# Patient Record
Sex: MALE | Race: WHITE | Employment: OTHER | URBAN - METROPOLITAN AREA
[De-identification: names, ages, dates, MRNs, and addresses within clinical notes are randomized per-mention and may not be internally consistent; named-entity substitution may affect disease eponyms.]

---

## 2018-03-31 ENCOUNTER — APPOINTMENT (OUTPATIENT)
Dept: GENERAL RADIOLOGY | Age: 38
End: 2018-03-31
Payer: MEDICARE

## 2018-03-31 ENCOUNTER — HOSPITAL ENCOUNTER (EMERGENCY)
Age: 38
Discharge: HOME | End: 2018-03-31
Payer: MEDICARE

## 2018-03-31 VITALS
OXYGEN SATURATION: 99 % | TEMPERATURE: 97.2 F | DIASTOLIC BLOOD PRESSURE: 82 MMHG | SYSTOLIC BLOOD PRESSURE: 131 MMHG | WEIGHT: 220 LBS | HEART RATE: 79 BPM | BODY MASS INDEX: 35.36 KG/M2 | HEIGHT: 66 IN | RESPIRATION RATE: 16 BRPM

## 2018-03-31 DIAGNOSIS — M54.50 ACUTE LOW BACK PAIN WITHOUT SCIATICA, UNSPECIFIED BACK PAIN LATERALITY: Primary | ICD-10-CM

## 2018-03-31 PROCEDURE — 99284 EMERGENCY DEPT VISIT MOD MDM: CPT

## 2018-03-31 PROCEDURE — 6370000000 HC RX 637 (ALT 250 FOR IP): Performed by: PHYSICIAN ASSISTANT

## 2018-03-31 PROCEDURE — 72100 X-RAY EXAM L-S SPINE 2/3 VWS: CPT

## 2018-03-31 RX ORDER — IBUPROFEN 800 MG/1
800 TABLET ORAL ONCE
Status: COMPLETED | OUTPATIENT
Start: 2018-03-31 | End: 2018-03-31

## 2018-03-31 RX ORDER — NAPROXEN 500 MG/1
500 TABLET ORAL 2 TIMES DAILY
Qty: 14 TABLET | Refills: 0 | Status: SHIPPED | OUTPATIENT
Start: 2018-03-31 | End: 2018-06-09 | Stop reason: ALTCHOICE

## 2018-03-31 RX ADMIN — IBUPROFEN 800 MG: 800 TABLET, FILM COATED ORAL at 11:34

## 2018-03-31 ASSESSMENT — PAIN DESCRIPTION - LOCATION: LOCATION: BACK

## 2018-03-31 ASSESSMENT — PAIN SCALES - GENERAL
PAINLEVEL_OUTOF10: 9
PAINLEVEL_OUTOF10: 8
PAINLEVEL_OUTOF10: 9

## 2018-03-31 ASSESSMENT — PAIN DESCRIPTION - DESCRIPTORS: DESCRIPTORS: PENETRATING

## 2018-03-31 ASSESSMENT — PAIN DESCRIPTION - ORIENTATION: ORIENTATION: LOWER

## 2018-03-31 ASSESSMENT — PAIN DESCRIPTION - FREQUENCY: FREQUENCY: INTERMITTENT

## 2018-03-31 ASSESSMENT — PAIN DESCRIPTION - PAIN TYPE: TYPE: ACUTE PAIN

## 2018-06-09 ENCOUNTER — HOSPITAL ENCOUNTER (EMERGENCY)
Age: 38
Discharge: HOME | End: 2018-06-09
Payer: MEDICARE

## 2018-06-09 VITALS
RESPIRATION RATE: 18 BRPM | OXYGEN SATURATION: 97 % | BODY MASS INDEX: 32.3 KG/M2 | HEART RATE: 80 BPM | HEIGHT: 66 IN | DIASTOLIC BLOOD PRESSURE: 120 MMHG | WEIGHT: 201 LBS | TEMPERATURE: 97.8 F | SYSTOLIC BLOOD PRESSURE: 204 MMHG

## 2018-06-09 DIAGNOSIS — K02.9 DENTAL DECAY: ICD-10-CM

## 2018-06-09 DIAGNOSIS — K04.7 DENTAL INFECTION: ICD-10-CM

## 2018-06-09 DIAGNOSIS — K04.7 DENTAL ABSCESS: Primary | ICD-10-CM

## 2018-06-09 PROCEDURE — 6360000002 HC RX W HCPCS: Performed by: NURSE PRACTITIONER

## 2018-06-09 PROCEDURE — 6370000000 HC RX 637 (ALT 250 FOR IP): Performed by: NURSE PRACTITIONER

## 2018-06-09 PROCEDURE — 99282 EMERGENCY DEPT VISIT SF MDM: CPT

## 2018-06-09 PROCEDURE — 96374 THER/PROPH/DIAG INJ IV PUSH: CPT

## 2018-06-09 RX ORDER — CLINDAMYCIN HYDROCHLORIDE 150 MG/1
300 CAPSULE ORAL ONCE
Status: COMPLETED | OUTPATIENT
Start: 2018-06-09 | End: 2018-06-09

## 2018-06-09 RX ORDER — IBUPROFEN 800 MG/1
800 TABLET ORAL EVERY 6 HOURS PRN
Qty: 20 TABLET | Refills: 3 | Status: SHIPPED | OUTPATIENT
Start: 2018-06-09 | End: 2018-06-14

## 2018-06-09 RX ORDER — OXYCODONE HYDROCHLORIDE AND ACETAMINOPHEN 5; 325 MG/1; MG/1
2 TABLET ORAL ONCE
Status: COMPLETED | OUTPATIENT
Start: 2018-06-09 | End: 2018-06-09

## 2018-06-09 RX ORDER — DEXAMETHASONE SODIUM PHOSPHATE 10 MG/ML
10 INJECTION, SOLUTION INTRAMUSCULAR; INTRAVENOUS ONCE
Status: COMPLETED | OUTPATIENT
Start: 2018-06-09 | End: 2018-06-09

## 2018-06-09 RX ORDER — OXYCODONE HYDROCHLORIDE AND ACETAMINOPHEN 5; 325 MG/1; MG/1
1 TABLET ORAL EVERY 6 HOURS PRN
Qty: 10 TABLET | Refills: 0 | Status: SHIPPED | OUTPATIENT
Start: 2018-06-09 | End: 2018-06-12

## 2018-06-09 RX ORDER — CLINDAMYCIN HYDROCHLORIDE 300 MG/1
300 CAPSULE ORAL 3 TIMES DAILY
Qty: 30 CAPSULE | Refills: 0 | Status: SHIPPED | OUTPATIENT
Start: 2018-06-09 | End: 2018-06-19

## 2018-06-09 RX ADMIN — OXYCODONE HYDROCHLORIDE AND ACETAMINOPHEN 2 TABLET: 5; 325 TABLET ORAL at 08:56

## 2018-06-09 RX ADMIN — DEXAMETHASONE SODIUM PHOSPHATE 10 MG: 10 INJECTION, SOLUTION INTRAMUSCULAR; INTRAVENOUS at 08:56

## 2018-06-09 RX ADMIN — CLINDAMYCIN HYDROCHLORIDE 300 MG: 150 CAPSULE ORAL at 08:55

## 2018-06-09 ASSESSMENT — PAIN DESCRIPTION - LOCATION: LOCATION: TEETH

## 2018-06-09 ASSESSMENT — PAIN DESCRIPTION - ORIENTATION: ORIENTATION: LEFT;UPPER

## 2018-06-09 ASSESSMENT — PAIN SCALES - GENERAL
PAINLEVEL_OUTOF10: 10
PAINLEVEL_OUTOF10: 9

## 2018-06-09 ASSESSMENT — PAIN DESCRIPTION - PAIN TYPE: TYPE: ACUTE PAIN

## 2018-06-09 ASSESSMENT — PAIN DESCRIPTION - DESCRIPTORS: DESCRIPTORS: THROBBING

## 2021-06-19 ENCOUNTER — APPOINTMENT (OUTPATIENT)
Dept: GENERAL RADIOLOGY | Age: 41
End: 2021-06-19
Payer: MEDICARE

## 2021-06-19 ENCOUNTER — APPOINTMENT (OUTPATIENT)
Dept: CT IMAGING | Age: 41
End: 2021-06-19
Payer: MEDICARE

## 2021-06-19 ENCOUNTER — HOSPITAL ENCOUNTER (EMERGENCY)
Age: 41
Discharge: HOME | End: 2021-06-19
Attending: EMERGENCY MEDICINE
Payer: MEDICARE

## 2021-06-19 VITALS
HEIGHT: 66 IN | SYSTOLIC BLOOD PRESSURE: 175 MMHG | WEIGHT: 211 LBS | TEMPERATURE: 97.8 F | HEART RATE: 81 BPM | OXYGEN SATURATION: 95 % | DIASTOLIC BLOOD PRESSURE: 118 MMHG | RESPIRATION RATE: 18 BRPM | BODY MASS INDEX: 33.91 KG/M2

## 2021-06-19 DIAGNOSIS — E87.6 HYPOKALEMIA: ICD-10-CM

## 2021-06-19 DIAGNOSIS — S27.329A CONTUSION OF LUNG, UNSPECIFIED LATERALITY, INITIAL ENCOUNTER: ICD-10-CM
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
## 2021-06-19 DIAGNOSIS — S42.001A CLOSED NONDISPLACED FRACTURE OF RIGHT CLAVICLE, UNSPECIFIED PART OF CLAVICLE, INITIAL ENCOUNTER: Primary | ICD-10-CM

## 2021-06-19 LAB
ANION GAP SERPL CALCULATED.3IONS-SCNC: 14 MMOL/L (ref 7–16)
BASOPHILS ABSOLUTE: 0.05 E9/L (ref 0–0.2)
BASOPHILS RELATIVE PERCENT: 0.2 % (ref 0–2)
BUN BLDV-MCNC: 3 MG/DL (ref 6–20)
CALCIUM SERPL-MCNC: 9.9 MG/DL (ref 8.6–10.2)
CHLORIDE BLD-SCNC: 99 MMOL/L (ref 98–107)
CO2: 27 MMOL/L (ref 22–29)
CREAT SERPL-MCNC: 0.8 MG/DL (ref 0.7–1.2)
EKG ATRIAL RATE: 99 BPM
EKG P AXIS: 33 DEGREES
EKG P-R INTERVAL: 150 MS
EKG Q-T INTERVAL: 372 MS
EKG QRS DURATION: 98 MS
EKG QTC CALCULATION (BAZETT): 477 MS
EKG R AXIS: 43 DEGREES
EKG T AXIS: 14 DEGREES
EKG VENTRICULAR RATE: 99 BPM
EOSINOPHILS ABSOLUTE: 0 E9/L (ref 0.05–0.5)
EOSINOPHILS RELATIVE PERCENT: 0 % (ref 0–6)
GFR AFRICAN AMERICAN: >60
GFR NON-AFRICAN AMERICAN: >60 ML/MIN/1.73
GLUCOSE BLD-MCNC: 188 MG/DL (ref 74–99)
HCT VFR BLD CALC: 52 % (ref 37–54)
HEMOGLOBIN: 17.4 G/DL (ref 12.5–16.5)
IMMATURE GRANULOCYTES #: 0.15 E9/L
IMMATURE GRANULOCYTES %: 0.7 % (ref 0–5)
LYMPHOCYTES ABSOLUTE: 2.22 E9/L (ref 1.5–4)
LYMPHOCYTES RELATIVE PERCENT: 10.3 % (ref 20–42)
MAGNESIUM: 1.9 MG/DL (ref 1.6–2.6)
MCH RBC QN AUTO: 28.3 PG (ref 26–35)
MCHC RBC AUTO-ENTMCNC: 33.5 % (ref 32–34.5)
MCV RBC AUTO: 84.7 FL (ref 80–99.9)
MONOCYTES ABSOLUTE: 1.18 E9/L (ref 0.1–0.95)
MONOCYTES RELATIVE PERCENT: 5.5 % (ref 2–12)
NEUTROPHILS ABSOLUTE: 17.99 E9/L (ref 1.8–7.3)
NEUTROPHILS RELATIVE PERCENT: 83.3 % (ref 43–80)
PDW BLD-RTO: 14.7 FL (ref 11.5–15)
PLATELET # BLD: 297 E9/L (ref 130–450)
PMV BLD AUTO: 10.7 FL (ref 7–12)
POTASSIUM REFLEX MAGNESIUM: 3.1 MMOL/L (ref 3.5–5)
RBC # BLD: 6.14 E12/L (ref 3.8–5.8)
SODIUM BLD-SCNC: 140 MMOL/L (ref 132–146)
WBC # BLD: 21.6 E9/L (ref 4.5–11.5)

## 2021-06-19 PROCEDURE — 96374 THER/PROPH/DIAG INJ IV PUSH: CPT

## 2021-06-19 PROCEDURE — 74176 CT ABD & PELVIS W/O CONTRAST: CPT

## 2021-06-19 PROCEDURE — 85025 COMPLETE CBC W/AUTO DIFF WBC: CPT

## 2021-06-19 PROCEDURE — 93010 ELECTROCARDIOGRAM REPORT: CPT | Performed by: INTERNAL MEDICINE

## 2021-06-19 PROCEDURE — 70450 CT HEAD/BRAIN W/O DYE: CPT

## 2021-06-19 PROCEDURE — 72125 CT NECK SPINE W/O DYE: CPT

## 2021-06-19 PROCEDURE — 71045 X-RAY EXAM CHEST 1 VIEW: CPT

## 2021-06-19 PROCEDURE — 6360000002 HC RX W HCPCS: Performed by: STUDENT IN AN ORGANIZED HEALTH CARE EDUCATION/TRAINING PROGRAM

## 2021-06-19 PROCEDURE — 6370000000 HC RX 637 (ALT 250 FOR IP): Performed by: STUDENT IN AN ORGANIZED HEALTH CARE EDUCATION/TRAINING PROGRAM

## 2021-06-19 PROCEDURE — 99285 EMERGENCY DEPT VISIT HI MDM: CPT

## 2021-06-19 PROCEDURE — 83735 ASSAY OF MAGNESIUM: CPT

## 2021-06-19 PROCEDURE — 93005 ELECTROCARDIOGRAM TRACING: CPT | Performed by: STUDENT IN AN ORGANIZED HEALTH CARE EDUCATION/TRAINING PROGRAM

## 2021-06-19 PROCEDURE — 96375 TX/PRO/DX INJ NEW DRUG ADDON: CPT

## 2021-06-19 PROCEDURE — 80048 BASIC METABOLIC PNL TOTAL CA: CPT

## 2021-06-19 PROCEDURE — 71250 CT THORAX DX C-: CPT

## 2021-06-19 PROCEDURE — 73030 X-RAY EXAM OF SHOULDER: CPT

## 2021-06-19 PROCEDURE — 73060 X-RAY EXAM OF HUMERUS: CPT

## 2021-06-19 RX ORDER — FENTANYL CITRATE 50 UG/ML
100 INJECTION, SOLUTION INTRAMUSCULAR; INTRAVENOUS ONCE
Status: COMPLETED | OUTPATIENT
Start: 2021-06-19 | End: 2021-06-19

## 2021-06-19 RX ORDER — HYDROCODONE BITARTRATE AND ACETAMINOPHEN 5; 325 MG/1; MG/1
1 TABLET ORAL EVERY 8 HOURS PRN
Qty: 8 TABLET | Refills: 0 | Status: SHIPPED | OUTPATIENT
Start: 2021-06-19 | End: 2021-06-24

## 2021-06-19 RX ORDER — KETOROLAC TROMETHAMINE 30 MG/ML
15 INJECTION, SOLUTION INTRAMUSCULAR; INTRAVENOUS ONCE
Status: COMPLETED | OUTPATIENT
Start: 2021-06-19 | End: 2021-06-19

## 2021-06-19 RX ADMIN — FENTANYL CITRATE 100 MCG: 50 INJECTION, SOLUTION INTRAMUSCULAR; INTRAVENOUS at 17:37

## 2021-06-19 RX ADMIN — KETOROLAC TROMETHAMINE 15 MG: 30 INJECTION, SOLUTION INTRAMUSCULAR; INTRAVENOUS at 21:00

## 2021-06-19 RX ADMIN — POTASSIUM BICARBONATE 40 MEQ: 782 TABLET, EFFERVESCENT ORAL at 18:58

## 2021-06-19 ASSESSMENT — PAIN DESCRIPTION - ONSET: ONSET: ON-GOING

## 2021-06-19 ASSESSMENT — PAIN - FUNCTIONAL ASSESSMENT
PAIN_FUNCTIONAL_ASSESSMENT: PREVENTS OR INTERFERES WITH ALL ACTIVE AND SOME PASSIVE ACTIVITIES
PAIN_FUNCTIONAL_ASSESSMENT: 0-10

## 2021-06-19 ASSESSMENT — PAIN DESCRIPTION - ORIENTATION: ORIENTATION: RIGHT

## 2021-06-19 ASSESSMENT — ENCOUNTER SYMPTOMS
COUGH: 0
WHEEZING: 0
EYE DISCHARGE: 0
SINUS PRESSURE: 0
EYE PAIN: 0
BACK PAIN: 0
SHORTNESS OF BREATH: 1
NAUSEA: 0
SORE THROAT: 0
DIARRHEA: 0
EYE REDNESS: 0
ABDOMINAL PAIN: 0
VOMITING: 0

## 2021-06-19 ASSESSMENT — PAIN SCALES - GENERAL
PAINLEVEL_OUTOF10: 9
PAINLEVEL_OUTOF10: 9
PAINLEVEL_OUTOF10: 8
PAINLEVEL_OUTOF10: 9

## 2021-06-19 ASSESSMENT — PAIN DESCRIPTION - DESCRIPTORS: DESCRIPTORS: SHARP

## 2021-06-19 ASSESSMENT — PAIN DESCRIPTION - PAIN TYPE: TYPE: ACUTE PAIN

## 2021-06-19 ASSESSMENT — PAIN DESCRIPTION - FREQUENCY: FREQUENCY: INTERMITTENT

## 2021-06-19 ASSESSMENT — PAIN DESCRIPTION - LOCATION: LOCATION: SHOULDER

## 2021-06-19 ASSESSMENT — PAIN DESCRIPTION - PROGRESSION: CLINICAL_PROGRESSION: GRADUALLY IMPROVING

## 2021-06-19 NOTE — ED NOTES
1721 Hrs - Trauma Alert called  5338 Hrs - Dr Antonio Glass called in for status     Dante Bucky  06/19/21 1729

## 2021-06-19 NOTE — PROGRESS NOTES
Incentive Spirometry performed. Patient achieved 1800mL with best effort with the goal being 1500mL.
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
## 2021-06-19 NOTE — ED NOTES
Dr. Duane Plush removed C-collar from patient due to negative CT scan     Roberto Garcia RN  06/19/21 1928
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
## 2023-06-30 ENCOUNTER — HOSPITAL ENCOUNTER (EMERGENCY)
Age: 43
Discharge: HOME | End: 2023-06-30
Attending: STUDENT IN AN ORGANIZED HEALTH CARE EDUCATION/TRAINING PROGRAM
Payer: MEDICARE

## 2023-06-30 ENCOUNTER — APPOINTMENT (OUTPATIENT)
Dept: GENERAL RADIOLOGY | Age: 43
End: 2023-06-30
Payer: MEDICARE

## 2023-06-30 VITALS
BODY MASS INDEX: 29.86 KG/M2 | SYSTOLIC BLOOD PRESSURE: 174 MMHG | HEART RATE: 91 BPM | TEMPERATURE: 98 F | RESPIRATION RATE: 18 BRPM | OXYGEN SATURATION: 98 % | WEIGHT: 185 LBS | DIASTOLIC BLOOD PRESSURE: 122 MMHG

## 2023-06-30 DIAGNOSIS — I10: ICD-10-CM
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
## 2023-06-30 DIAGNOSIS — S51.851A CAT BITE OF FOREARM, RIGHT, INITIAL ENCOUNTER: Primary | ICD-10-CM

## 2023-06-30 PROCEDURE — 96372 THER/PROPH/DIAG INJ SC/IM: CPT

## 2023-06-30 PROCEDURE — 90714 TD VACC NO PRESV 7 YRS+ IM: CPT | Performed by: NURSE PRACTITIONER

## 2023-06-30 PROCEDURE — 90471 IMMUNIZATION ADMIN: CPT | Performed by: NURSE PRACTITIONER

## 2023-06-30 PROCEDURE — 6370000000 HC RX 637 (ALT 250 FOR IP): Performed by: STUDENT IN AN ORGANIZED HEALTH CARE EDUCATION/TRAINING PROGRAM

## 2023-06-30 PROCEDURE — 99284 EMERGENCY DEPT VISIT MOD MDM: CPT

## 2023-06-30 PROCEDURE — 6370000000 HC RX 637 (ALT 250 FOR IP): Performed by: NURSE PRACTITIONER

## 2023-06-30 PROCEDURE — 73110 X-RAY EXAM OF WRIST: CPT

## 2023-06-30 PROCEDURE — 6360000002 HC RX W HCPCS: Performed by: NURSE PRACTITIONER

## 2023-06-30 RX ORDER — CLINDAMYCIN HYDROCHLORIDE 150 MG/1
300 CAPSULE ORAL ONCE
Status: COMPLETED | OUTPATIENT
Start: 2023-06-30 | End: 2023-06-30

## 2023-06-30 RX ORDER — AMLODIPINE BESYLATE 5 MG/1
5 TABLET ORAL DAILY
Qty: 14 TABLET | Refills: 0 | Status: SHIPPED | OUTPATIENT
Start: 2023-06-30 | End: 2023-07-14

## 2023-06-30 RX ORDER — CLINDAMYCIN HYDROCHLORIDE 300 MG/1
300 CAPSULE ORAL 3 TIMES DAILY
Qty: 30 CAPSULE | Refills: 0 | Status: SHIPPED | OUTPATIENT
Start: 2023-06-30 | End: 2023-07-10

## 2023-06-30 RX ORDER — CYCLOBENZAPRINE HCL 5 MG
10 TABLET ORAL ONCE
Status: COMPLETED | OUTPATIENT
Start: 2023-06-30 | End: 2023-06-30

## 2023-06-30 RX ORDER — DOXYCYCLINE HYCLATE 100 MG
100 TABLET ORAL 2 TIMES DAILY
Qty: 20 TABLET | Refills: 0 | Status: SHIPPED | OUTPATIENT
Start: 2023-06-30 | End: 2023-07-10

## 2023-06-30 RX ORDER — TETANUS AND DIPHTHERIA TOXOIDS ADSORBED 2; 2 [LF]/.5ML; [LF]/.5ML
0.5 INJECTION INTRAMUSCULAR ONCE
Status: COMPLETED | OUTPATIENT
Start: 2023-06-30 | End: 2023-06-30

## 2023-06-30 RX ORDER — CLONIDINE HYDROCHLORIDE 0.1 MG/1
0.1 TABLET ORAL ONCE
Status: COMPLETED | OUTPATIENT
Start: 2023-06-30 | End: 2023-06-30

## 2023-06-30 RX ORDER — KETOROLAC TROMETHAMINE 30 MG/ML
30 INJECTION, SOLUTION INTRAMUSCULAR; INTRAVENOUS ONCE
Status: COMPLETED | OUTPATIENT
Start: 2023-06-30 | End: 2023-06-30

## 2023-06-30 RX ORDER — AMLODIPINE BESYLATE 5 MG/1
5 TABLET ORAL ONCE
Status: COMPLETED | OUTPATIENT
Start: 2023-06-30 | End: 2023-06-30

## 2023-06-30 RX ORDER — DOXYCYCLINE HYCLATE 100 MG/1
100 CAPSULE ORAL ONCE
Status: COMPLETED | OUTPATIENT
Start: 2023-06-30 | End: 2023-06-30

## 2023-06-30 RX ORDER — CLONIDINE HYDROCHLORIDE 0.1 MG/1
0.1 TABLET ORAL DAILY
Qty: 14 TABLET | Refills: 0 | Status: SHIPPED | OUTPATIENT
Start: 2023-06-30 | End: 2023-07-14

## 2023-06-30 RX ADMIN — CLINDAMYCIN HYDROCHLORIDE 300 MG: 150 CAPSULE ORAL at 17:35

## 2023-06-30 RX ADMIN — CLONIDINE HYDROCHLORIDE 0.1 MG: 0.1 TABLET ORAL at 21:51

## 2023-06-30 RX ADMIN — KETOROLAC TROMETHAMINE 30 MG: 30 INJECTION, SOLUTION INTRAMUSCULAR; INTRAVENOUS at 19:24

## 2023-06-30 RX ADMIN — TETANUS AND DIPHTHERIA TOXOIDS ADSORBED 0.5 ML: 2; 2 INJECTION INTRAMUSCULAR at 17:38

## 2023-06-30 RX ADMIN — CYCLOBENZAPRINE HYDROCHLORIDE 10 MG: 5 TABLET, FILM COATED ORAL at 21:51

## 2023-06-30 RX ADMIN — DOXYCYCLINE HYCLATE 100 MG: 100 CAPSULE ORAL at 17:34

## 2023-06-30 RX ADMIN — AMLODIPINE BESYLATE 5 MG: 5 TABLET ORAL at 19:23

## 2023-06-30 ASSESSMENT — PAIN DESCRIPTION - ORIENTATION: ORIENTATION: RIGHT

## 2023-06-30 ASSESSMENT — PAIN DESCRIPTION - LOCATION: LOCATION: WRIST

## 2023-06-30 ASSESSMENT — LIFESTYLE VARIABLES: HOW OFTEN DO YOU HAVE A DRINK CONTAINING ALCOHOL: MONTHLY OR LESS

## 2023-06-30 ASSESSMENT — PAIN - FUNCTIONAL ASSESSMENT: PAIN_FUNCTIONAL_ASSESSMENT: NONE - DENIES PAIN

## 2023-06-30 ASSESSMENT — PAIN SCALES - GENERAL: PAINLEVEL_OUTOF10: 8

## 2024-07-01 ENCOUNTER — HOSPITAL ENCOUNTER (INPATIENT)
Age: 44
LOS: 1 days | Discharge: LEFT BEFORE DISCHARGE | DRG: 202 | End: 2024-07-01
Attending: EMERGENCY MEDICINE | Admitting: INTERNAL MEDICINE
Payer: MEDICARE

## 2024-07-01 ENCOUNTER — APPOINTMENT (OUTPATIENT)
Dept: GENERAL RADIOLOGY | Age: 44
DRG: 202 | End: 2024-07-01
Payer: MEDICARE

## 2024-07-01 VITALS
SYSTOLIC BLOOD PRESSURE: 148 MMHG | BODY MASS INDEX: 30.53 KG/M2 | HEART RATE: 96 BPM | HEIGHT: 66 IN | TEMPERATURE: 98.2 F | RESPIRATION RATE: 18 BRPM | WEIGHT: 190 LBS | DIASTOLIC BLOOD PRESSURE: 100 MMHG | OXYGEN SATURATION: 95 %

## 2024-07-01 DIAGNOSIS — I50.9 CONGESTIVE HEART FAILURE, UNSPECIFIED HF CHRONICITY, UNSPECIFIED HEART FAILURE TYPE (HCC): ICD-10-CM

## 2024-07-01 DIAGNOSIS — I10 ESSENTIAL HYPERTENSION: Primary | ICD-10-CM

## 2024-07-01 DIAGNOSIS — J40 BRONCHITIS: ICD-10-CM

## 2024-07-01 DIAGNOSIS — I10 HYPERTENSION, UNSPECIFIED TYPE: ICD-10-CM

## 2024-07-01 DIAGNOSIS — I21.4 NSTEMI (NON-ST ELEVATED MYOCARDIAL INFARCTION) (HCC): ICD-10-CM

## 2024-07-01 DIAGNOSIS — I16.1 HYPERTENSIVE EMERGENCY: ICD-10-CM

## 2024-07-01 LAB
ALBUMIN SERPL-MCNC: 3.3 G/DL (ref 3.5–5.2)
ALP SERPL-CCNC: 152 U/L (ref 40–129)
ALT SERPL-CCNC: 34 U/L (ref 0–40)
AMYLASE SERPL-CCNC: 39 U/L (ref 20–100)
ANION GAP SERPL CALCULATED.3IONS-SCNC: 14 MMOL/L (ref 7–16)
AST SERPL-CCNC: 32 U/L (ref 0–39)
BILIRUB DIRECT SERPL-MCNC: 0.3 MG/DL (ref 0–0.3)
BILIRUB INDIRECT SERPL-MCNC: 0.4 MG/DL (ref 0–1)
BILIRUB SERPL-MCNC: 0.7 MG/DL (ref 0–1.2)
BNP SERPL-MCNC: 7835 PG/ML (ref 0–125)
BUN SERPL-MCNC: 14 MG/DL (ref 6–20)
CALCIUM SERPL-MCNC: 8.9 MG/DL (ref 8.6–10.2)
CHLORIDE SERPL-SCNC: 100 MMOL/L (ref 98–107)
CK SERPL-CCNC: 80 U/L (ref 20–200)
CO2 SERPL-SCNC: 24 MMOL/L (ref 22–29)
CREAT SERPL-MCNC: 1.4 MG/DL (ref 0.7–1.2)
D-DIMER QUANTITATIVE: 335 NG/ML DDU (ref 0–230)
EKG ATRIAL RATE: 117 BPM
EKG P AXIS: 40 DEGREES
EKG P-R INTERVAL: 146 MS
EKG Q-T INTERVAL: 348 MS
EKG QRS DURATION: 96 MS
EKG QTC CALCULATION (BAZETT): 485 MS
EKG R AXIS: 19 DEGREES
EKG T AXIS: 0 DEGREES
EKG VENTRICULAR RATE: 117 BPM
ERYTHROCYTE [DISTWIDTH] IN BLOOD BY AUTOMATED COUNT: 15.5 % (ref 11.5–15)
FLUAV RNA RESP QL NAA+PROBE: NOT DETECTED
FLUBV RNA RESP QL NAA+PROBE: NOT DETECTED
GFR, ESTIMATED: 63 ML/MIN/1.73M2
GLUCOSE SERPL-MCNC: 180 MG/DL (ref 74–99)
HCT VFR BLD AUTO: 49.9 % (ref 37–54)
HGB BLD-MCNC: 15.8 G/DL (ref 12.5–16.5)
INR PPP: 1.6
LIPASE SERPL-CCNC: 30 U/L (ref 13–60)
MAGNESIUM SERPL-MCNC: 1.8 MG/DL (ref 1.6–2.6)
MCH RBC QN AUTO: 25.9 PG (ref 26–35)
MCHC RBC AUTO-ENTMCNC: 31.7 G/DL (ref 32–34.5)
MCV RBC AUTO: 81.9 FL (ref 80–99.9)
PLATELET # BLD AUTO: 262 K/UL (ref 130–450)
PMV BLD AUTO: 10.6 FL (ref 7–12)
POTASSIUM SERPL-SCNC: 3.6 MMOL/L (ref 3.5–5)
PROT SERPL-MCNC: 5.9 G/DL (ref 6.4–8.3)
PROTHROMBIN TIME: 17 SEC (ref 9.3–12.4)
RBC # BLD AUTO: 6.09 M/UL (ref 3.8–5.8)
RSV BY PCR: NOT DETECTED
SARS-COV-2 RNA RESP QL NAA+PROBE: NOT DETECTED
SODIUM SERPL-SCNC: 138 MMOL/L (ref 132–146)
SOURCE: NORMAL
SPECIMEN DESCRIPTION: NORMAL
SPECIMEN SOURCE: NORMAL
TROPONIN I SERPL HS-MCNC: 94 NG/L (ref 0–11)
WBC OTHER # BLD: 9.4 K/UL (ref 4.5–11.5)

## 2024-07-01 PROCEDURE — 84484 ASSAY OF TROPONIN QUANT: CPT

## 2024-07-01 PROCEDURE — 85610 PROTHROMBIN TIME: CPT

## 2024-07-01 PROCEDURE — 82248 BILIRUBIN DIRECT: CPT

## 2024-07-01 PROCEDURE — 6360000002 HC RX W HCPCS: Performed by: EMERGENCY MEDICINE

## 2024-07-01 PROCEDURE — 6370000000 HC RX 637 (ALT 250 FOR IP): Performed by: EMERGENCY MEDICINE

## 2024-07-01 PROCEDURE — 96365 THER/PROPH/DIAG IV INF INIT: CPT

## 2024-07-01 PROCEDURE — 93005 ELECTROCARDIOGRAM TRACING: CPT | Performed by: EMERGENCY MEDICINE

## 2024-07-01 PROCEDURE — 71045 X-RAY EXAM CHEST 1 VIEW: CPT

## 2024-07-01 PROCEDURE — 96375 TX/PRO/DX INJ NEW DRUG ADDON: CPT

## 2024-07-01 PROCEDURE — 99285 EMERGENCY DEPT VISIT HI MDM: CPT

## 2024-07-01 PROCEDURE — 83690 ASSAY OF LIPASE: CPT

## 2024-07-01 PROCEDURE — 1200000000 HC SEMI PRIVATE

## 2024-07-01 PROCEDURE — 87634 RSV DNA/RNA AMP PROBE: CPT

## 2024-07-01 PROCEDURE — 83735 ASSAY OF MAGNESIUM: CPT

## 2024-07-01 PROCEDURE — 82150 ASSAY OF AMYLASE: CPT

## 2024-07-01 PROCEDURE — 85379 FIBRIN DEGRADATION QUANT: CPT

## 2024-07-01 PROCEDURE — 93010 ELECTROCARDIOGRAM REPORT: CPT | Performed by: INTERNAL MEDICINE

## 2024-07-01 PROCEDURE — 82550 ASSAY OF CK (CPK): CPT

## 2024-07-01 PROCEDURE — 83880 ASSAY OF NATRIURETIC PEPTIDE: CPT

## 2024-07-01 PROCEDURE — 80053 COMPREHEN METABOLIC PANEL: CPT

## 2024-07-01 PROCEDURE — 87636 SARSCOV2 & INF A&B AMP PRB: CPT

## 2024-07-01 PROCEDURE — 85027 COMPLETE CBC AUTOMATED: CPT

## 2024-07-01 RX ORDER — FUROSEMIDE 10 MG/ML
60 INJECTION INTRAMUSCULAR; INTRAVENOUS ONCE
Status: COMPLETED | OUTPATIENT
Start: 2024-07-01 | End: 2024-07-01

## 2024-07-01 RX ORDER — ASPIRIN 81 MG/1
324 TABLET, CHEWABLE ORAL ONCE
Status: COMPLETED | OUTPATIENT
Start: 2024-07-01 | End: 2024-07-01

## 2024-07-01 RX ORDER — DEXAMETHASONE SODIUM PHOSPHATE 10 MG/ML
10 INJECTION INTRAMUSCULAR; INTRAVENOUS ONCE
Status: COMPLETED | OUTPATIENT
Start: 2024-07-01 | End: 2024-07-01

## 2024-07-01 RX ORDER — HEPARIN SODIUM 10000 [USP'U]/100ML
5-30 INJECTION, SOLUTION INTRAVENOUS CONTINUOUS
Status: DISCONTINUED | OUTPATIENT
Start: 2024-07-01 | End: 2024-07-01 | Stop reason: HOSPADM

## 2024-07-01 RX ORDER — NITROGLYCERIN 0.4 MG/1
0.4 TABLET SUBLINGUAL EVERY 5 MIN PRN
Status: DISCONTINUED | OUTPATIENT
Start: 2024-07-01 | End: 2024-07-01 | Stop reason: HOSPADM

## 2024-07-01 RX ORDER — HEPARIN SODIUM 1000 [USP'U]/ML
4000 INJECTION, SOLUTION INTRAVENOUS; SUBCUTANEOUS PRN
Status: DISCONTINUED | OUTPATIENT
Start: 2024-07-01 | End: 2024-07-01 | Stop reason: HOSPADM

## 2024-07-01 RX ORDER — HYDRALAZINE HYDROCHLORIDE 20 MG/ML
10 INJECTION INTRAMUSCULAR; INTRAVENOUS ONCE
Status: COMPLETED | OUTPATIENT
Start: 2024-07-01 | End: 2024-07-01

## 2024-07-01 RX ORDER — HYDRALAZINE HYDROCHLORIDE 20 MG/ML
10 INJECTION INTRAMUSCULAR; INTRAVENOUS ONCE
Status: DISCONTINUED | OUTPATIENT
Start: 2024-07-01 | End: 2024-07-01

## 2024-07-01 RX ORDER — LABETALOL HYDROCHLORIDE 5 MG/ML
10 INJECTION, SOLUTION INTRAVENOUS ONCE
Status: COMPLETED | OUTPATIENT
Start: 2024-07-01 | End: 2024-07-01

## 2024-07-01 RX ORDER — HEPARIN SODIUM 1000 [USP'U]/ML
4000 INJECTION, SOLUTION INTRAVENOUS; SUBCUTANEOUS ONCE
Status: DISCONTINUED | OUTPATIENT
Start: 2024-07-01 | End: 2024-07-01 | Stop reason: HOSPADM

## 2024-07-01 RX ORDER — MAGNESIUM SULFATE IN WATER 40 MG/ML
2000 INJECTION, SOLUTION INTRAVENOUS ONCE
Status: COMPLETED | OUTPATIENT
Start: 2024-07-01 | End: 2024-07-01

## 2024-07-01 RX ORDER — IPRATROPIUM BROMIDE AND ALBUTEROL SULFATE 2.5; .5 MG/3ML; MG/3ML
1 SOLUTION RESPIRATORY (INHALATION)
Status: COMPLETED | OUTPATIENT
Start: 2024-07-01 | End: 2024-07-01

## 2024-07-01 RX ORDER — HEPARIN SODIUM 1000 [USP'U]/ML
2000 INJECTION, SOLUTION INTRAVENOUS; SUBCUTANEOUS PRN
Status: DISCONTINUED | OUTPATIENT
Start: 2024-07-01 | End: 2024-07-01 | Stop reason: HOSPADM

## 2024-07-01 RX ORDER — ASPIRIN 81 MG/1
324 TABLET, CHEWABLE ORAL ONCE
Status: DISCONTINUED | OUTPATIENT
Start: 2024-07-01 | End: 2024-07-01 | Stop reason: HOSPADM

## 2024-07-01 RX ADMIN — IPRATROPIUM BROMIDE AND ALBUTEROL SULFATE 1 DOSE: .5; 3 SOLUTION RESPIRATORY (INHALATION) at 01:49

## 2024-07-01 RX ADMIN — FUROSEMIDE 60 MG: 10 INJECTION, SOLUTION INTRAMUSCULAR; INTRAVENOUS at 03:23

## 2024-07-01 RX ADMIN — DEXAMETHASONE SODIUM PHOSPHATE 10 MG: 10 INJECTION INTRAMUSCULAR; INTRAVENOUS at 02:02

## 2024-07-01 RX ADMIN — IPRATROPIUM BROMIDE AND ALBUTEROL SULFATE 1 DOSE: .5; 3 SOLUTION RESPIRATORY (INHALATION) at 02:02

## 2024-07-01 RX ADMIN — LABETALOL HYDROCHLORIDE 10 MG: 5 INJECTION, SOLUTION INTRAVENOUS at 03:29

## 2024-07-01 RX ADMIN — HYDRALAZINE HYDROCHLORIDE 10 MG: 20 INJECTION INTRAMUSCULAR; INTRAVENOUS at 02:09

## 2024-07-01 RX ADMIN — IPRATROPIUM BROMIDE AND ALBUTEROL SULFATE 1 DOSE: .5; 3 SOLUTION RESPIRATORY (INHALATION) at 02:19

## 2024-07-01 RX ADMIN — MAGNESIUM SULFATE HEPTAHYDRATE 2000 MG: 40 INJECTION, SOLUTION INTRAVENOUS at 02:05

## 2024-07-01 RX ADMIN — ASPIRIN 81 MG 324 MG: 81 TABLET ORAL at 03:21

## 2024-07-01 ASSESSMENT — PAIN DESCRIPTION - ORIENTATION: ORIENTATION: UPPER

## 2024-07-01 ASSESSMENT — PAIN DESCRIPTION - DESCRIPTORS: DESCRIPTORS: ACHING;SHARP;TIGHTNESS

## 2024-07-01 ASSESSMENT — PAIN DESCRIPTION - LOCATION
LOCATION: BACK
LOCATION: BACK;CHEST

## 2024-07-01 ASSESSMENT — PAIN - FUNCTIONAL ASSESSMENT
PAIN_FUNCTIONAL_ASSESSMENT: 0-10
PAIN_FUNCTIONAL_ASSESSMENT: 0-10
PAIN_FUNCTIONAL_ASSESSMENT: PREVENTS OR INTERFERES SOME ACTIVE ACTIVITIES AND ADLS

## 2024-07-01 ASSESSMENT — PAIN DESCRIPTION - PAIN TYPE: TYPE: ACUTE PAIN

## 2024-07-01 ASSESSMENT — PAIN SCALES - GENERAL: PAINLEVEL_OUTOF10: 5

## 2024-07-01 ASSESSMENT — PAIN DESCRIPTION - FREQUENCY: FREQUENCY: CONTINUOUS

## 2024-07-01 ASSESSMENT — PAIN DESCRIPTION - ONSET: ONSET: ON-GOING

## 2024-07-01 NOTE — ED PROVIDER NOTES
HPI:  7/1/24, Time: 1:34 AM EDT         Alexis Taylor is a 43 y.o. male presenting to the ED for shortness of breath he is a smoker he has not been taking his blood pressure medication he has no chest pain or back pain right now only when he breathes and coughs, beginning days ago.  The complaint has been persistent, moderate in severity, and worsened by nothing.  Please note has not been taking his blood pressure medication no leg swelling no calf pain on send he smokes cigarettes and marijuana    ROS:   Pertinent positives and negatives are stated within HPI, all other systems reviewed and are negative.  --------------------------------------------- PAST HISTORY ---------------------------------------------  Past Medical History:  has a past medical history of Bipolar 1 disorder (HCC), Cerebral artery occlusion with cerebral infarction (HCC), GERD (gastroesophageal reflux disease), Hypertension, Myoclonic jerking, and Post traumatic stress disorder (PTSD).    Past Surgical History:  has a past surgical history that includes Finger surgery.    Social History:  reports that he has been smoking cigarettes. He has never used smokeless tobacco. He reports current drug use. Frequency: 3.00 times per week. Drug: Marijuana (Weed). He reports that he does not drink alcohol.    Family History: family history is not on file.     The patient’s home medications have been reviewed.    Allergies: Bee venom, Dye [iodides], Nutritional supplements, Penicillins, and Vicodin [hydrocodone-acetaminophen]    ---------------------------------------------------PHYSICAL EXAM--------------------------------------    Constitutional/General: Alert and oriented x3, well appearing, non toxic in NAD  Head: Normocephalic and atraumatic  Eyes: PERRL, EOMI  Mouth: Oropharynx clear, handling secretions, no trismus  Neck: Supple, full ROM, non tender to palpation in the midline, no stridor, no crepitus, no meningeal signs  Pulmonary: Lung decreased  100 98 - 107 mmol/L    CO2 24 22 - 29 mmol/L    Anion Gap 14 7 - 16 mmol/L    Glucose 180 (H) 74 - 99 mg/dL    BUN 14 6 - 20 mg/dL    Creatinine 1.4 (H) 0.70 - 1.20 mg/dL    Est, Glom Filt Rate 63 >60 mL/min/1.73m2    Calcium 8.9 8.6 - 10.2 mg/dL   CK   Result Value Ref Range    Total CK 80 20 - 200 U/L   D-Dimer, Quantitative   Result Value Ref Range    D-Dimer, Quant 335 (H) 0 - 230 ng/mL DDU   Hepatic Function Panel   Result Value Ref Range    Albumin 3.3 (L) 3.5 - 5.2 g/dL    Alkaline Phosphatase 152 (H) 40 - 129 U/L    ALT 34 0 - 40 U/L    AST 32 0 - 39 U/L    Total Bilirubin 0.7 0.0 - 1.2 mg/dL    Bilirubin, Direct 0.3 0.0 - 0.3 mg/dL    Bilirubin, Indirect 0.4 0.0 - 1.0 mg/dL    Total Protein 5.9 (L) 6.4 - 8.3 g/dL   Lipase   Result Value Ref Range    Lipase 30 13 - 60 U/L   Amylase   Result Value Ref Range    Amylase 39 20 - 100 U/L   Magnesium   Result Value Ref Range    Magnesium 1.8 1.6 - 2.6 mg/dL   Brain Natriuretic Peptide   Result Value Ref Range    Pro-BNP 7,835 (H) 0 - 125 pg/mL   Protime-INR   Result Value Ref Range    Protime 17.0 (H) 9.3 - 12.4 sec    INR 1.6    EKG 12 Lead   Result Value Ref Range    Ventricular Rate 117 BPM    Atrial Rate 117 BPM    P-R Interval 146 ms    QRS Duration 96 ms    Q-T Interval 348 ms    QTc Calculation (Bazett) 485 ms    P Axis 40 degrees    R Axis 19 degrees    T Axis 0 degrees       RADIOLOGY:  Interpreted by Radiologist.  XR CHEST 1 VIEW   Final Result   Cardiomediastinal silhouette is near upper normal limits for size.      No focal consolidation, sizeable pleural effusion, or gross pneumothorax.               EKG Interpretation  Interpreted by emergency department physician    Rhythm: normal sinus   Rate: tachycardic 117  Axis: normal  Conduction: normal  ST Segments: nonspecific changes  T Waves: non specific changes    Clinical Impression: non-specific EKG  Comparison to prior EKG: nonspecific EKG      ------------------------- NURSING NOTES AND VITALS

## 2024-07-01 NOTE — ED NOTES
Pt left AMA after risks were explained. Pt encouraged to return to ER if symptoms worsen. Pt verbalized understanding.

## 2024-10-14 ENCOUNTER — APPOINTMENT (OUTPATIENT)
Dept: PRIMARY CARE | Facility: CLINIC | Age: 44
End: 2024-10-14
Payer: COMMERCIAL

## 2024-10-14 VITALS — RESPIRATION RATE: 14 BRPM | OXYGEN SATURATION: 94 % | HEART RATE: 112 BPM

## 2024-10-14 DIAGNOSIS — I21.4 MI, ACUTE, NON ST SEGMENT ELEVATION (MULTI): ICD-10-CM

## 2024-10-14 DIAGNOSIS — I50.9 CONGESTIVE HEART FAILURE, UNSPECIFIED HF CHRONICITY, UNSPECIFIED HEART FAILURE TYPE: ICD-10-CM

## 2024-10-14 DIAGNOSIS — I50.43 ACUTE ON CHRONIC COMBINED SYSTOLIC AND DIASTOLIC CONGESTIVE HEART FAILURE: ICD-10-CM

## 2024-10-14 DIAGNOSIS — I15.9 SECONDARY HYPERTENSION: ICD-10-CM

## 2024-10-14 PROBLEM — F43.23 ADJUSTMENT DISORDER WITH MIXED ANXIETY AND DEPRESSED MOOD: Status: ACTIVE | Noted: 2021-12-02

## 2024-10-14 PROBLEM — S42.001A CLOSED FRACTURE OF RIGHT CLAVICLE: Status: ACTIVE | Noted: 2021-07-06

## 2024-10-14 PROBLEM — H53.8 BLURRED VISION: Status: ACTIVE | Noted: 2024-10-14

## 2024-10-14 PROBLEM — E66.9 OBESITY, UNSPECIFIED: Status: ACTIVE | Noted: 2024-10-14

## 2024-10-14 PROBLEM — K08.89 OTHER SPECIFIED DISORDERS OF TEETH AND SUPPORTING STRUCTURES: Status: ACTIVE | Noted: 2024-10-14

## 2024-10-14 PROBLEM — R21 RASH: Status: ACTIVE | Noted: 2024-10-14

## 2024-10-14 PROBLEM — I10 HYPERTENSION: Status: ACTIVE | Noted: 2021-03-23

## 2024-10-14 PROBLEM — M54.2 CERVICAL SPINE PAIN: Status: ACTIVE | Noted: 2024-10-14

## 2024-10-14 PROBLEM — B37.9 YEAST INFECTION: Status: ACTIVE | Noted: 2024-10-14

## 2024-10-14 PROBLEM — G93.41 METABOLIC ENCEPHALOPATHY: Status: ACTIVE | Noted: 2024-10-14

## 2024-10-14 PROBLEM — I10 BENIGN ESSENTIAL HYPERTENSION: Status: ACTIVE | Noted: 2024-10-14

## 2024-10-14 PROBLEM — F13.20 BENZODIAZEPINE DEPENDENCE (MULTI): Status: ACTIVE | Noted: 2022-06-26

## 2024-10-14 PROBLEM — L02.91 ABSCESS: Status: ACTIVE | Noted: 2024-10-14

## 2024-10-14 PROBLEM — K21.9 ESOPHAGEAL REFLUX: Status: ACTIVE | Noted: 2024-10-14

## 2024-10-14 PROBLEM — R60.0 LEG EDEMA: Status: ACTIVE | Noted: 2024-10-14

## 2024-10-14 PROBLEM — F90.1 ADHD (ATTENTION DEFICIT HYPERACTIVITY DISORDER), PREDOMINANTLY HYPERACTIVE IMPULSIVE TYPE: Status: ACTIVE | Noted: 2024-10-14

## 2024-10-14 PROBLEM — K04.7 DENTAL ABSCESS: Status: ACTIVE | Noted: 2018-06-09

## 2024-10-14 PROBLEM — R60.9 EDEMA: Status: ACTIVE | Noted: 2024-10-14

## 2024-10-14 PROBLEM — E11.9 DIABETES MELLITUS (MULTI): Status: ACTIVE | Noted: 2021-03-23

## 2024-10-14 PROBLEM — E55.9 VITAMIN D DEFICIENCY: Status: ACTIVE | Noted: 2024-10-14

## 2024-10-14 PROBLEM — I16.1 HYPERTENSIVE EMERGENCY: Status: ACTIVE | Noted: 2024-07-01

## 2024-10-14 PROBLEM — E87.6 HYPOKALEMIA: Status: ACTIVE | Noted: 2024-10-14

## 2024-10-14 PROBLEM — L03.90 CELLULITIS: Status: ACTIVE | Noted: 2024-10-14

## 2024-10-14 PROBLEM — K04.7 DENTAL INFECTION: Status: ACTIVE | Noted: 2018-06-09

## 2024-10-14 PROBLEM — F32.A DEPRESSION: Status: ACTIVE | Noted: 2021-03-23

## 2024-10-14 PROBLEM — F41.9 ANXIETY: Status: ACTIVE | Noted: 2021-07-06

## 2024-10-14 PROBLEM — R15.9 INCONTINENT OF FECES: Status: ACTIVE | Noted: 2024-10-14

## 2024-10-14 PROBLEM — R20.0 LEFT ARM NUMBNESS: Status: ACTIVE | Noted: 2024-10-14

## 2024-10-14 PROBLEM — R10.9 ABDOMINAL PAIN: Status: ACTIVE | Noted: 2024-10-14

## 2024-10-14 PROBLEM — R32 INCONTINENT OF URINE: Status: ACTIVE | Noted: 2024-10-14

## 2024-10-14 PROBLEM — I63.9 CEREBRAL INFARCTION, UNSPECIFIED: Status: ACTIVE | Noted: 2024-10-14

## 2024-10-14 PROBLEM — J40 BRONCHITIS: Status: ACTIVE | Noted: 2024-07-01

## 2024-10-14 PROBLEM — L73.9 FOLLICULITIS: Status: ACTIVE | Noted: 2024-10-14

## 2024-10-14 PROBLEM — M54.9 BACK PAIN: Status: ACTIVE | Noted: 2024-10-14

## 2024-10-14 PROCEDURE — 99204 OFFICE O/P NEW MOD 45 MIN: CPT | Performed by: INTERNAL MEDICINE

## 2024-10-14 RX ORDER — FUROSEMIDE 40 MG/1
40 TABLET ORAL 2 TIMES DAILY
Qty: 60 TABLET | Refills: 11 | Status: SHIPPED | OUTPATIENT
Start: 2024-10-14 | End: 2025-10-14

## 2024-10-14 RX ORDER — METOPROLOL SUCCINATE 50 MG/1
50 TABLET, EXTENDED RELEASE ORAL DAILY
Qty: 30 TABLET | Refills: 5 | Status: SHIPPED | OUTPATIENT
Start: 2024-10-14 | End: 2025-04-12

## 2024-10-14 NOTE — ASSESSMENT & PLAN NOTE
07/01 admission    NSTEMI, CHF, Hypertensive emergency in 07/01.     CXR showed sizeable pleural effusion.     Troponin, High Sensitivity 94     Pro-BNP 7,835    D-dimer elevated    Not currently taking any medications.   Orders placed for nuclear stress test, echocardiogram, CXR to evaluate for CHF with possible underlying CAD  Will refer to cardiology.   Begin taking Lasix 40 mg, Metoprolol tartrate 50 mg BID  Will follow-up in 1 week for annual wellness and reassesses medications.

## 2024-10-14 NOTE — PROGRESS NOTES
Subjective   Chief complaint: Candido Roman is a 43 y.o. male who presents for Follow-up (Pt is being seen today for heart attack follow up. ).    HPI:  Patient presents for follow-up after hospitalization in beginning of July. He states that he continues to have increased dyspnea on exertion, orthopnea, and swelling in both lower extremities.     He does not take any medications for the past several years but would like to start taking all of them again.         Objective   Pulse (!) 112   Resp 14   SpO2 94%   Physical Exam  Vitals reviewed.   Constitutional:       General: He is not in acute distress.  HENT:      Head: Normocephalic.      Right Ear: External ear normal.      Left Ear: External ear normal.   Eyes:      Extraocular Movements: Extraocular movements intact.   Cardiovascular:      Rate and Rhythm: Regular rhythm. Tachycardia present.      Pulses: Normal pulses.      Heart sounds: Normal heart sounds.   Pulmonary:      Effort: Pulmonary effort is normal.      Breath sounds: Normal breath sounds. No wheezing or rhonchi.   Musculoskeletal:         General: No tenderness.      Right lower leg: Edema present.      Left lower leg: Edema present.   Neurological:      General: No focal deficit present.      Mental Status: He is alert.   Psychiatric:         Mood and Affect: Mood normal.         Behavior: Behavior normal.         I have reviewed and reconciled the medication list with the patient today. No current outpatient medications on file.     Imaging:  No results found.     Labs reviewed:    Lab Results   Component Value Date    WBC 11.9 (H) 11/06/2020    HGB 16.7 11/06/2020    HCT 51.2 11/06/2020     11/06/2020    CHOL 159 11/06/2020    TRIG 142 11/06/2020    HDL 51.0 11/06/2020    ALT 83 (H) 11/06/2020    AST 42 (H) 11/06/2020     (L) 11/06/2020    K 2.9 (LL) 11/06/2020    CL 92 (L) 11/06/2020    CREATININE 0.75 11/06/2020    BUN 7 11/06/2020    CO2 23 11/06/2020    TSH 5.19 (H)  11/06/2020    HGBA1C 9.2 11/06/2020       Assessment/Plan   Problem List Items Addressed This Visit       Hypertension     Current /113. Denies headache, blurry vision, shortness or breath, chest pain.   Currently not on any medications. Reinforced lifestyle modifications including physical activity, dietary efforts, and low sodium diet. Follow-up blood pressure check at next visit.          Congestive heart failure     07/01 admission    NSTEMI, CHF, Hypertensive emergency in 07/01.     CXR showed sizeable pleural effusion.     Troponin, High Sensitivity 94     Pro-BNP 7,835    D-dimer elevated    Not currently taking any medications.   Orders placed for nuclear stress test, echocardiogram, CXR to evaluate for CHF with possible underlying CAD  Will refer to cardiology.   Begin taking Lasix 40 mg, Metoprolol tartrate 50 mg BID  Will follow-up in 1 week for annual wellness and reassesses medications.             Continue current medications as listed  Follow up in this week / 1 week for annual wellness.

## 2024-10-14 NOTE — ASSESSMENT & PLAN NOTE
Current /113. Denies headache, blurry vision, shortness or breath, chest pain.   Currently not on any medications. Reinforced lifestyle modifications including physical activity, dietary efforts, and low sodium diet. Follow-up blood pressure check at next visit.

## 2024-10-15 ENCOUNTER — APPOINTMENT (OUTPATIENT)
Dept: PRIMARY CARE | Facility: CLINIC | Age: 44
End: 2024-10-15
Payer: COMMERCIAL

## 2024-10-18 ENCOUNTER — OFFICE VISIT (OUTPATIENT)
Dept: PRIMARY CARE | Facility: CLINIC | Age: 44
End: 2024-10-18
Payer: COMMERCIAL

## 2024-10-18 VITALS
DIASTOLIC BLOOD PRESSURE: 85 MMHG | RESPIRATION RATE: 12 BRPM | SYSTOLIC BLOOD PRESSURE: 138 MMHG | HEART RATE: 74 BPM | OXYGEN SATURATION: 96 % | BODY MASS INDEX: 29.7 KG/M2 | WEIGHT: 184 LBS

## 2024-10-18 DIAGNOSIS — I50.9 CONGESTIVE HEART FAILURE, UNSPECIFIED HF CHRONICITY, UNSPECIFIED HEART FAILURE TYPE: ICD-10-CM

## 2024-10-18 DIAGNOSIS — K04.7 DENTAL INFECTION: Primary | ICD-10-CM

## 2024-10-18 DIAGNOSIS — I10 HYPERTENSION, UNSPECIFIED TYPE: ICD-10-CM

## 2024-10-18 PROBLEM — S19.9XXA INJURY OF NECK: Status: ACTIVE | Noted: 2024-10-18

## 2024-10-18 PROBLEM — K08.89 TOOTHACHE: Status: ACTIVE | Noted: 2024-10-18

## 2024-10-18 PROCEDURE — 3075F SYST BP GE 130 - 139MM HG: CPT | Performed by: INTERNAL MEDICINE

## 2024-10-18 PROCEDURE — 3079F DIAST BP 80-89 MM HG: CPT | Performed by: INTERNAL MEDICINE

## 2024-10-18 PROCEDURE — 99214 OFFICE O/P EST MOD 30 MIN: CPT | Performed by: INTERNAL MEDICINE

## 2024-10-18 RX ORDER — CLINDAMYCIN HYDROCHLORIDE 300 MG/1
300 CAPSULE ORAL 3 TIMES DAILY
Qty: 30 CAPSULE | Refills: 0 | Status: SHIPPED | OUTPATIENT
Start: 2024-10-18 | End: 2024-10-28

## 2024-10-18 NOTE — PROGRESS NOTES
"Subjective   Chief complaint: Candido Roman \"Xiomara" is a 43 y.o. male who presents for Leg Swelling.    HPI:  Pt presents with c/o swelling in feet bilaterally. Has been on and off swelling.     Also endorses infection in mouth.        Objective   /85   Pulse 74   Resp 12   Wt 83.5 kg (184 lb)   SpO2 96%   BMI 29.70 kg/m²   Physical Exam  Constitutional:       Appearance: Normal appearance.   HENT:      Head: Normocephalic and atraumatic.      Right Ear: External ear normal.      Left Ear: External ear normal.   Eyes:      Extraocular Movements: Extraocular movements intact.   Cardiovascular:      Rate and Rhythm: Normal rate and regular rhythm.      Heart sounds: Normal heart sounds.   Pulmonary:      Effort: Pulmonary effort is normal. No respiratory distress.      Breath sounds: Normal breath sounds. No wheezing.   Musculoskeletal:      Right lower leg: Edema present.      Left lower leg: Edema present.   Skin:     Findings: Erythema present.      Comments: Bilateral lower extremities   Neurological:      Mental Status: He is alert.         I have reviewed and reconciled the medication list with the patient today.   Current Outpatient Medications:     furosemide (Lasix) 40 mg tablet, Take 1 tablet (40 mg) by mouth 2 times a day., Disp: 60 tablet, Rfl: 11    metoprolol succinate XL (Toprol-XL) 50 mg 24 hr tablet, Take 1 tablet (50 mg) by mouth once daily. Do not crush or chew., Disp: 30 tablet, Rfl: 5     Imaging:  No results found.     Labs reviewed:    Lab Results   Component Value Date    WBC 11.9 (H) 11/06/2020    HGB 16.7 11/06/2020    HCT 51.2 11/06/2020     11/06/2020    CHOL 159 11/06/2020    TRIG 142 11/06/2020    HDL 51.0 11/06/2020    ALT 83 (H) 11/06/2020    AST 42 (H) 11/06/2020     (L) 11/06/2020    K 2.9 (LL) 11/06/2020    CL 92 (L) 11/06/2020    CREATININE 0.75 11/06/2020    BUN 7 11/06/2020    CO2 23 11/06/2020    TSH 5.19 (H) 11/06/2020    HGBA1C 9.2 11/06/2020 "       Assessment/Plan   Problem List Items Addressed This Visit       Dental infection - Primary     Clindamycin 300mg TID for 10 days         Hypertension     Continue metoprolol 50mg         Congestive heart failure     Stress test   Echocardiogram     Referral previously to cardiology    Swelling improved; continue using lasix             Continue current medications as listed  Follow up for annual exam next week.

## 2024-10-22 ENCOUNTER — LAB (OUTPATIENT)
Dept: LAB | Facility: LAB | Age: 44
End: 2024-10-22
Payer: COMMERCIAL

## 2024-10-22 ENCOUNTER — APPOINTMENT (OUTPATIENT)
Dept: PRIMARY CARE | Facility: CLINIC | Age: 44
End: 2024-10-22
Payer: COMMERCIAL

## 2024-10-22 DIAGNOSIS — E55.9 VITAMIN D DEFICIENCY: ICD-10-CM

## 2024-10-22 DIAGNOSIS — I21.4 NSTEMI (NON-ST ELEVATED MYOCARDIAL INFARCTION) (MULTI): ICD-10-CM

## 2024-10-22 DIAGNOSIS — Z12.5 SCREENING PSA (PROSTATE SPECIFIC ANTIGEN): ICD-10-CM

## 2024-10-22 DIAGNOSIS — E87.6 HYPOKALEMIA: ICD-10-CM

## 2024-10-22 DIAGNOSIS — E11.69 TYPE 2 DIABETES MELLITUS WITH OTHER SPECIFIED COMPLICATION, UNSPECIFIED WHETHER LONG TERM INSULIN USE (MULTI): ICD-10-CM

## 2024-10-22 DIAGNOSIS — I10 BENIGN ESSENTIAL HYPERTENSION: ICD-10-CM

## 2024-10-22 DIAGNOSIS — Z00.00 ENCOUNTER FOR ANNUAL PHYSICAL EXAM: ICD-10-CM

## 2024-10-22 LAB
25(OH)D3 SERPL-MCNC: 14 NG/ML (ref 30–100)
ALBUMIN SERPL BCP-MCNC: 3.4 G/DL (ref 3.4–5)
ALP SERPL-CCNC: 154 U/L (ref 33–120)
ALT SERPL W P-5'-P-CCNC: 11 U/L (ref 10–52)
ANION GAP SERPL CALC-SCNC: 17 MMOL/L (ref 10–20)
AST SERPL W P-5'-P-CCNC: 15 U/L (ref 9–39)
BILIRUB SERPL-MCNC: 1.3 MG/DL (ref 0–1.2)
BUN SERPL-MCNC: 10 MG/DL (ref 6–23)
CALCIUM SERPL-MCNC: 8.8 MG/DL (ref 8.6–10.6)
CARDIAC TROPONIN I PNL SERPL HS: 82 NG/L (ref 0–53)
CHLORIDE SERPL-SCNC: 97 MMOL/L (ref 98–107)
CHOLEST SERPL-MCNC: 123 MG/DL (ref 0–199)
CHOLESTEROL/HDL RATIO: 3.4
CO2 SERPL-SCNC: 28 MMOL/L (ref 21–32)
CREAT SERPL-MCNC: 1.16 MG/DL (ref 0.5–1.3)
D DIMER PPP FEU-MCNC: 2012 NG/ML FEU
EGFRCR SERPLBLD CKD-EPI 2021: 81 ML/MIN/1.73M*2
ERYTHROCYTE [DISTWIDTH] IN BLOOD BY AUTOMATED COUNT: 19.3 % (ref 11.5–14.5)
EST. AVERAGE GLUCOSE BLD GHB EST-MCNC: 177 MG/DL
GLUCOSE SERPL-MCNC: 232 MG/DL (ref 74–99)
HBA1C MFR BLD: 7.8 %
HCT VFR BLD AUTO: 49.8 % (ref 41–52)
HDLC SERPL-MCNC: 36.7 MG/DL
HGB BLD-MCNC: 15.2 G/DL (ref 13.5–17.5)
LDLC SERPL CALC-MCNC: 77 MG/DL
MCH RBC QN AUTO: 25 PG (ref 26–34)
MCHC RBC AUTO-ENTMCNC: 30.5 G/DL (ref 32–36)
MCV RBC AUTO: 82 FL (ref 80–100)
NON HDL CHOLESTEROL: 86 MG/DL (ref 0–149)
NRBC BLD-RTO: 0 /100 WBCS (ref 0–0)
PLATELET # BLD AUTO: 283 X10*3/UL (ref 150–450)
POTASSIUM SERPL-SCNC: 3.8 MMOL/L (ref 3.5–5.3)
PROT SERPL-MCNC: 6.4 G/DL (ref 6.4–8.2)
PSA SERPL-MCNC: 0.69 NG/ML
RBC # BLD AUTO: 6.07 X10*6/UL (ref 4.5–5.9)
SODIUM SERPL-SCNC: 138 MMOL/L (ref 136–145)
TRIGL SERPL-MCNC: 47 MG/DL (ref 0–149)
TSH SERPL-ACNC: 1.33 MIU/L (ref 0.44–3.98)
VLDL: 9 MG/DL (ref 0–40)
WBC # BLD AUTO: 7.8 X10*3/UL (ref 4.4–11.3)

## 2024-10-22 PROCEDURE — 36415 COLL VENOUS BLD VENIPUNCTURE: CPT

## 2024-10-22 PROCEDURE — 80061 LIPID PANEL: CPT

## 2024-10-22 PROCEDURE — 85379 FIBRIN DEGRADATION QUANT: CPT

## 2024-10-22 PROCEDURE — 84153 ASSAY OF PSA TOTAL: CPT

## 2024-10-22 PROCEDURE — 84443 ASSAY THYROID STIM HORMONE: CPT

## 2024-10-22 PROCEDURE — 82306 VITAMIN D 25 HYDROXY: CPT

## 2024-10-22 PROCEDURE — 84484 ASSAY OF TROPONIN QUANT: CPT

## 2024-10-22 PROCEDURE — 83036 HEMOGLOBIN GLYCOSYLATED A1C: CPT

## 2024-10-22 PROCEDURE — 85027 COMPLETE CBC AUTOMATED: CPT

## 2024-10-22 PROCEDURE — 80053 COMPREHEN METABOLIC PANEL: CPT

## 2024-10-24 ENCOUNTER — APPOINTMENT (OUTPATIENT)
Dept: PRIMARY CARE | Facility: CLINIC | Age: 44
End: 2024-10-24
Payer: COMMERCIAL

## 2024-10-28 ENCOUNTER — LAB (OUTPATIENT)
Dept: LAB | Facility: LAB | Age: 44
End: 2024-10-28
Payer: COMMERCIAL

## 2024-10-28 ENCOUNTER — OFFICE VISIT (OUTPATIENT)
Dept: CARDIOLOGY | Facility: CLINIC | Age: 44
End: 2024-10-28
Payer: COMMERCIAL

## 2024-10-28 VITALS
HEIGHT: 66 IN | HEART RATE: 108 BPM | BODY MASS INDEX: 29.25 KG/M2 | OXYGEN SATURATION: 98 % | WEIGHT: 182 LBS | DIASTOLIC BLOOD PRESSURE: 88 MMHG | SYSTOLIC BLOOD PRESSURE: 150 MMHG

## 2024-10-28 DIAGNOSIS — I15.9 SECONDARY HYPERTENSION: ICD-10-CM

## 2024-10-28 DIAGNOSIS — R22.43 LOCALIZED SWELLING OF BOTH LOWER LEGS: ICD-10-CM

## 2024-10-28 DIAGNOSIS — I50.9 CONGESTIVE HEART FAILURE, UNSPECIFIED HF CHRONICITY, UNSPECIFIED HEART FAILURE TYPE: ICD-10-CM

## 2024-10-28 DIAGNOSIS — E11.9 DIABETES MELLITUS TYPE II, NON INSULIN DEPENDENT (MULTI): ICD-10-CM

## 2024-10-28 DIAGNOSIS — I50.9 CONGESTIVE HEART FAILURE, UNSPECIFIED HF CHRONICITY, UNSPECIFIED HEART FAILURE TYPE: Primary | ICD-10-CM

## 2024-10-28 LAB
ANION GAP SERPL CALC-SCNC: 16 MMOL/L (ref 10–20)
BNP SERPL-MCNC: 1707 PG/ML (ref 0–99)
BUN SERPL-MCNC: 17 MG/DL (ref 6–23)
CALCIUM SERPL-MCNC: 8.5 MG/DL (ref 8.6–10.6)
CHLORIDE SERPL-SCNC: 97 MMOL/L (ref 98–107)
CO2 SERPL-SCNC: 29 MMOL/L (ref 21–32)
CREAT SERPL-MCNC: 1.19 MG/DL (ref 0.5–1.3)
EGFRCR SERPLBLD CKD-EPI 2021: 78 ML/MIN/1.73M*2
GLUCOSE SERPL-MCNC: 193 MG/DL (ref 74–99)
POTASSIUM SERPL-SCNC: 3.3 MMOL/L (ref 3.5–5.3)
SODIUM SERPL-SCNC: 139 MMOL/L (ref 136–145)

## 2024-10-28 PROCEDURE — 3048F LDL-C <100 MG/DL: CPT

## 2024-10-28 PROCEDURE — 36415 COLL VENOUS BLD VENIPUNCTURE: CPT

## 2024-10-28 PROCEDURE — 3079F DIAST BP 80-89 MM HG: CPT

## 2024-10-28 PROCEDURE — 4010F ACE/ARB THERAPY RXD/TAKEN: CPT

## 2024-10-28 PROCEDURE — 83880 ASSAY OF NATRIURETIC PEPTIDE: CPT

## 2024-10-28 PROCEDURE — 80048 BASIC METABOLIC PNL TOTAL CA: CPT

## 2024-10-28 PROCEDURE — 93005 ELECTROCARDIOGRAM TRACING: CPT

## 2024-10-28 PROCEDURE — 99244 OFF/OP CNSLTJ NEW/EST MOD 40: CPT

## 2024-10-28 PROCEDURE — 3077F SYST BP >= 140 MM HG: CPT

## 2024-10-28 PROCEDURE — 3051F HG A1C>EQUAL 7.0%<8.0%: CPT

## 2024-10-28 PROCEDURE — 3008F BODY MASS INDEX DOCD: CPT

## 2024-10-28 PROCEDURE — 99214 OFFICE O/P EST MOD 30 MIN: CPT

## 2024-10-28 RX ORDER — LOSARTAN POTASSIUM 50 MG/1
50 TABLET ORAL DAILY
Qty: 30 TABLET | Refills: 11 | Status: SHIPPED | OUTPATIENT
Start: 2024-10-28 | End: 2025-10-28

## 2024-10-28 RX ORDER — METOPROLOL SUCCINATE 50 MG/1
50 TABLET, EXTENDED RELEASE ORAL DAILY
Qty: 30 TABLET | Refills: 5 | Status: SHIPPED | OUTPATIENT
Start: 2024-10-28 | End: 2025-04-26

## 2024-10-28 ASSESSMENT — ENCOUNTER SYMPTOMS
OCCASIONAL FEELINGS OF UNSTEADINESS: 0
DYSPNEA ON EXERTION: 1

## 2024-10-29 DIAGNOSIS — E87.6 HYPOKALEMIA: Primary | ICD-10-CM

## 2024-10-29 RX ORDER — POTASSIUM CHLORIDE 20 MEQ/1
TABLET, EXTENDED RELEASE ORAL
Qty: 60 TABLET | Refills: 11 | Status: SHIPPED | OUTPATIENT
Start: 2024-10-29

## 2024-11-01 LAB
ATRIAL RATE: 106 BPM
P AXIS: 50 DEGREES
P OFFSET: 198 MS
P ONSET: 134 MS
PR INTERVAL: 168 MS
Q ONSET: 218 MS
QRS COUNT: 17 BEATS
QRS DURATION: 108 MS
QT INTERVAL: 372 MS
QTC CALCULATION(BAZETT): 494 MS
QTC FREDERICIA: 449 MS
R AXIS: -11 DEGREES
T AXIS: 66 DEGREES
T OFFSET: 404 MS
VENTRICULAR RATE: 106 BPM

## 2024-11-06 ENCOUNTER — APPOINTMENT (OUTPATIENT)
Dept: RADIOLOGY | Facility: HOSPITAL | Age: 44
End: 2024-11-06
Payer: COMMERCIAL

## 2024-11-06 ENCOUNTER — APPOINTMENT (OUTPATIENT)
Dept: CARDIOLOGY | Facility: HOSPITAL | Age: 44
End: 2024-11-06
Payer: COMMERCIAL

## 2024-11-06 ENCOUNTER — HOSPITAL ENCOUNTER (OUTPATIENT)
Dept: CARDIOLOGY | Facility: HOSPITAL | Age: 44
Discharge: HOME | End: 2024-11-06
Payer: COMMERCIAL

## 2024-11-06 ENCOUNTER — HOSPITAL ENCOUNTER (OUTPATIENT)
Dept: RADIOLOGY | Facility: HOSPITAL | Age: 44
Discharge: HOME | End: 2024-11-06
Payer: COMMERCIAL

## 2024-11-06 DIAGNOSIS — I21.4 MI, ACUTE, NON ST SEGMENT ELEVATION (MULTI): ICD-10-CM

## 2024-11-06 DIAGNOSIS — I50.9 CONGESTIVE HEART FAILURE, UNSPECIFIED HF CHRONICITY, UNSPECIFIED HEART FAILURE TYPE: ICD-10-CM

## 2024-11-06 DIAGNOSIS — I50.43 ACUTE ON CHRONIC COMBINED SYSTOLIC AND DIASTOLIC CONGESTIVE HEART FAILURE: ICD-10-CM

## 2024-11-06 PROCEDURE — 93306 TTE W/DOPPLER COMPLETE: CPT

## 2024-11-06 PROCEDURE — 93306 TTE W/DOPPLER COMPLETE: CPT | Performed by: INTERNAL MEDICINE

## 2024-11-07 ENCOUNTER — HOSPITAL ENCOUNTER (OUTPATIENT)
Dept: RADIOLOGY | Facility: HOSPITAL | Age: 44
Discharge: HOME | End: 2024-11-07
Payer: COMMERCIAL

## 2024-11-07 ENCOUNTER — TELEPHONE (OUTPATIENT)
Dept: CARDIOLOGY | Facility: HOSPITAL | Age: 44
End: 2024-11-07

## 2024-11-07 ENCOUNTER — HOSPITAL ENCOUNTER (OUTPATIENT)
Dept: CARDIOLOGY | Facility: HOSPITAL | Age: 44
Discharge: HOME | End: 2024-11-07
Payer: COMMERCIAL

## 2024-11-07 DIAGNOSIS — I21.4 MI, ACUTE, NON ST SEGMENT ELEVATION (MULTI): ICD-10-CM

## 2024-11-07 DIAGNOSIS — I50.9 CONGESTIVE HEART FAILURE, UNSPECIFIED HF CHRONICITY, UNSPECIFIED HEART FAILURE TYPE: ICD-10-CM

## 2024-11-07 LAB
EJECTION FRACTION APICAL 4 CHAMBER: 36.1
EJECTION FRACTION: 38 %
LEFT ATRIUM VOLUME AREA LENGTH INDEX BSA: 46.7 ML/M2
LEFT VENTRICLE INTERNAL DIMENSION DIASTOLE: 5.8 CM (ref 3.5–6)
LEFT VENTRICULAR OUTFLOW TRACT DIAMETER: 2.2 CM
LV EJECTION FRACTION BIPLANE: 38 %
MITRAL VALVE E/E' RATIO: 9.9
RIGHT VENTRICLE FREE WALL PEAK S': 12 CM/S
RIGHT VENTRICLE PEAK SYSTOLIC PRESSURE: 45.9 MMHG
TRICUSPID ANNULAR PLANE SYSTOLIC EXCURSION: 1.8 CM

## 2024-11-07 PROCEDURE — 93018 CV STRESS TEST I&R ONLY: CPT | Performed by: INTERNAL MEDICINE

## 2024-11-07 PROCEDURE — 93016 CV STRESS TEST SUPVJ ONLY: CPT | Performed by: INTERNAL MEDICINE

## 2024-11-07 PROCEDURE — 93017 CV STRESS TEST TRACING ONLY: CPT

## 2024-11-07 PROCEDURE — 78452 HT MUSCLE IMAGE SPECT MULT: CPT | Performed by: INTERNAL MEDICINE

## 2024-11-07 PROCEDURE — 3430000001 HC RX 343 DIAGNOSTIC RADIOPHARMACEUTICALS: Performed by: INTERNAL MEDICINE

## 2024-11-07 PROCEDURE — 2500000004 HC RX 250 GENERAL PHARMACY W/ HCPCS (ALT 636 FOR OP/ED): Performed by: INTERNAL MEDICINE

## 2024-11-07 PROCEDURE — 78452 HT MUSCLE IMAGE SPECT MULT: CPT

## 2024-11-07 PROCEDURE — A9502 TC99M TETROFOSMIN: HCPCS | Performed by: INTERNAL MEDICINE

## 2024-11-07 RX ORDER — REGADENOSON 0.08 MG/ML
0.4 INJECTION, SOLUTION INTRAVENOUS ONCE
Status: COMPLETED | OUTPATIENT
Start: 2024-11-07 | End: 2024-11-07

## 2024-11-07 NOTE — TELEPHONE ENCOUNTER
11/8/24  1557  Returned call to patient.    Informed patient of stress test results ands offered appt with Dr. Ron; patient was agreeable to appt.    Message sent to Sofi to set up appt with patient.    Patient does report LE edema and asked why the edema. Explained to patient that that the edema is due to  the heart not functioning well and not pumping as effectively.    Recommended to patient to:  Keep sodium intake less than 2 grams per day, elevating LE when not up and to wear compression socks to help keep edema down.    Patient verbalized understanding.    11/7/24  1537  Called patient; no answer. Left voice message for patient to return call for stress test results.    ----- Message from Darvin Ron sent at 11/7/2024  1:12 PM EST -----  Stress is abnormal.  Notify ordering provider and setup appointment if requested.

## 2024-11-08 ENCOUNTER — TELEPHONE (OUTPATIENT)
Dept: CARDIOLOGY | Facility: HOSPITAL | Age: 44
End: 2024-11-08
Payer: COMMERCIAL

## 2024-11-15 ENCOUNTER — TELEMEDICINE (OUTPATIENT)
Dept: CARDIOLOGY | Facility: CLINIC | Age: 44
End: 2024-11-15
Payer: COMMERCIAL

## 2024-11-15 DIAGNOSIS — I15.9 SECONDARY HYPERTENSION: ICD-10-CM

## 2024-11-15 DIAGNOSIS — I50.22 CHRONIC SYSTOLIC HEART FAILURE: Primary | ICD-10-CM

## 2024-11-15 PROCEDURE — 3048F LDL-C <100 MG/DL: CPT

## 2024-11-15 PROCEDURE — 99214 OFFICE O/P EST MOD 30 MIN: CPT

## 2024-11-15 PROCEDURE — 3051F HG A1C>EQUAL 7.0%<8.0%: CPT

## 2024-11-15 RX ORDER — FUROSEMIDE 40 MG/1
40 TABLET ORAL DAILY
Qty: 30 TABLET | Refills: 11 | Status: SHIPPED | OUTPATIENT
Start: 2024-11-15 | End: 2025-11-15

## 2024-11-15 RX ORDER — SPIRONOLACTONE 25 MG/1
25 TABLET ORAL DAILY
Qty: 30 TABLET | Refills: 11 | Status: SHIPPED | OUTPATIENT
Start: 2024-11-15 | End: 2025-11-15

## 2024-11-15 NOTE — PATIENT INSTRUCTIONS
Candido,    - Stop Losartan and Potassium  - Start Entresto 24-26 one tab twice daily, Spironolactone 25 mg daily   - Lab work in 7 days    Follow up in 11/19    Brenda JOHANSEN-CNP

## 2024-11-15 NOTE — PROGRESS NOTES
Virtual or Telephone Consent    An interactive audio and video telecommunication system which permits real time communications between the patient (at the originating site) and provider (at the distant site) was utilized to provide this telehealth service.   Verbal consent was requested and obtained from Candido Roman on this date, 11/15/24 for a telehealth visit.    Chief Complaint:   Follow up Nuclear Stress test and echocardiogram     History Of Present Illness:    Kai Roman is a 43 y.o. male presenting with PMH of 30 pack smoking history, Bipolar 1 disorder,  Type 2 Diabetes, Cerebral artery occlusion with cerebral infarction , GERD (gastroesophageal reflux disease), Hypertension, Myoclonic jerking, and Post traumatic stress disorder (PTSD).   13    Patient reports that he has been feeling better since restarting medications. We discussed the results of the echocardiogram and Nuclear Stress Test . We also discussed life style modification which included quitting smoking. However, patient is not ready to quit smoking. Patient denies chest pain and angina.  Pt denies shortness of breath, dyspnea on exertion, orthopnea, and paroxysmal nocturnal dyspnea.  Pt denies worsening lower extremity edema.  Pt denies palpitations or syncope.  No recent falls.  No fever or chills.  No cough.  No change in bowel or bladder habits.  No sick contacts.  No recent travel.    Review of Systems   All other systems reviewed and are negative.    Last Recorded Vitals:  There were no vitals filed for this visit.    Past Medical History:  He has a past medical history of Unspecified injury of neck, initial encounter.    Past Surgical History:  He has a past surgical history that includes Hand surgery (10/08/2014).      Social History:  He reports that he has been smoking cigarettes. He has never used smokeless tobacco. He reports that he does not currently use alcohol. He reports current drug use. Drug: Marijuana.    Family  History:  No family history on file.     Allergies:  Iodine, Penicillins, Acetaminophen, Bee venom protein (honey bee), Hydrocodone bitartrate, Hydrocodone-acetaminophen, Iodides, Iodinated contrast media, and Nutritional supplements    Outpatient Medications:  Current Outpatient Medications   Medication Instructions    furosemide (LASIX) 40 mg, oral, 2 times daily    Jardiance 25 mg, oral, Daily    losartan (COZAAR) 50 mg, oral, Daily    metoprolol succinate XL (TOPROL-XL) 50 mg, oral, Daily, Do not crush or chew.    potassium chloride CR (Klor-Con M20) 20 mEq ER tablet Do not crush or chew.    Take 2 pills today . Then one tablet daily.     Physical examination performed via telemedicine encounter:  General: awake, alert, non-diaphoretic, no psychomotor agitation, no acute distress.  Head: atraumatic, normocephalic, no rashes or lesions noted.  Eyes: no redness, discharge, swelling, or lesions.  Nose: no redness, swelling, discharge, deformity, or impetigo/crusting.  Skin: no lesions, wounds, erythema, or cyanosis noted on face or hands.  Cardiopulmonary: no increased respiratory effort, speaking in clear sentences, inspiratory to expiratory ratio within normal limits.  Musculoskeletal: normal range of motion of neck, upper extremities, and hands with no obvious synovitis.  Neurologic: cranial nerves grossly normal, speech normal rate and rhythm, moved both upper extremities equally.  Psychiatric: normal appearance, normal behavior, and normal attitude; well groomed, pleasant, cooperative.       Last Labs:  CBC -  Lab Results   Component Value Date    WBC 7.8 10/22/2024    HGB 15.2 10/22/2024    HCT 49.8 10/22/2024    MCV 82 10/22/2024     10/22/2024       CMP -  Lab Results   Component Value Date    CALCIUM 8.5 (L) 10/28/2024    PROT 6.4 10/22/2024    ALBUMIN 3.4 10/22/2024    AST 15 10/22/2024    ALT 11 10/22/2024    ALKPHOS 154 (H) 10/22/2024    BILITOT 1.3 (H) 10/22/2024       LIPID PANEL -   Lab  Results   Component Value Date    CHOL 123 10/22/2024    TRIG 47 10/22/2024    HDL 36.7 10/22/2024    CHHDL 3.4 10/22/2024    LDLF 80 11/06/2020    VLDL 9 10/22/2024    NHDL 86 10/22/2024       RENAL FUNCTION PANEL -   Lab Results   Component Value Date    GLUCOSE 193 (H) 10/28/2024     10/28/2024    K 3.3 (L) 10/28/2024    CL 97 (L) 10/28/2024    CO2 29 10/28/2024    ANIONGAP 16 10/28/2024    BUN 17 10/28/2024    CREATININE 1.19 10/28/2024    CALCIUM 8.5 (L) 10/28/2024    ALBUMIN 3.4 10/22/2024        Lab Results   Component Value Date    BNP 1,707 (H) 10/28/2024    HGBA1C 7.8 (H) 10/22/2024       Last Cardiology Tests:  ECG:  ECG 12 lead (Clinic Performed) 10/28/2024    Echo:  Transthoracic Echo Complete 11/06/2024  CONCLUSIONS:   1. The left ventricular systolic function is moderately decreased, with a Head's biplane calculated ejection fraction of 38%.   2. There is global hypokinesis of the left ventricle with minor regional variations.   3. Spectral Doppler shows a Grade II (pseudonormal pattern) of left ventricular diastolic filling.   4. Left ventricular cavity size is mildly dilated.   5. There is reduced right ventricular systolic function.   6. Moderately enlarged right ventricle.   7. The left atrium is moderately dilated.   8. Moderate to severe tricuspid regurgitation.   9. Moderately elevated right ventricular systolic pressure.  10. Aortic valve stenosis is not present.  Ejection Fractions:  EF   Date/Time Value Ref Range Status   11/06/2024 11:57 AM 38 %      Stress Test:  Nuclear Stress Test 11/07/2024    FINDINGS:  There is a moderate-sized reversible perfusion defect in the inferior  and inferoseptal walls which improves on prone imaging suggesting  soft tissue attenuation although can not completely rule out ischemia  given significant LV dysfunction.      Calculated ejection fraction of 16% with global hypokinesis.      IMPRESSION:  1. There is a moderate-sized reversible perfusion  defect in the  inferior and inferoseptal walls which improves on prone imaging  suggesting soft tissue attenuation although can not completely rule  out ischemia given significant LV dysfunction.      2. Calculated ejection fraction of 16% with global hypokinesis.        Assessment/Plan   Kai Roman is a 43 y.o. male presenting with PMH of 30 pack smoking history, Bipolar 1 disorder,  Type 2 Diabetes, Cerebral artery occlusion with cerebral infarction , GERD (gastroesophageal reflux disease), Hypertension, Myoclonic jerking, and Post traumatic stress disorder (PTSD).   13    Patient reports that he has been feeling better since restarting medications. We discussed the results of the echocardiogram and Nuclear Stress Test . We also discussed life style modification which included quitting smoking. However, patient is not ready to quit smoking.     # NYHA Class 1  AHA Stage B/ Systolic Heart Failure   10/28/2024 Cr 1.19, BNP 1707  GDMT :  Continue SGLT2i, betablocker  STOP :ARB, Potassium  Decrease Loop Diuretic ( Furosemide decrease to 40 mg daily from 80 mg  )   START Sacubitril-valsartan 24-26 mg one tab twice daily, Spironolactone 25 mg daily   - Will need a [facility] once GDMT has been maximized .   - cMRI  - BNP, BMP in 7 days  - Follow up with PCP  - Follow up with Cardiology 11/19       HAILY Anguiano-CNP

## 2024-11-19 ENCOUNTER — TELEPHONE (OUTPATIENT)
Dept: PRIMARY CARE | Facility: CLINIC | Age: 44
End: 2024-11-19

## 2024-11-19 ENCOUNTER — APPOINTMENT (OUTPATIENT)
Dept: PRIMARY CARE | Facility: CLINIC | Age: 44
End: 2024-11-19
Payer: COMMERCIAL

## 2024-11-19 VITALS
HEART RATE: 71 BPM | OXYGEN SATURATION: 96 % | RESPIRATION RATE: 12 BRPM | WEIGHT: 173 LBS | BODY MASS INDEX: 27.92 KG/M2

## 2024-11-19 DIAGNOSIS — I50.43 ACUTE ON CHRONIC COMBINED SYSTOLIC AND DIASTOLIC CONGESTIVE HEART FAILURE: ICD-10-CM

## 2024-11-19 DIAGNOSIS — E11.9 DIABETES MELLITUS TYPE II, NON INSULIN DEPENDENT (MULTI): Primary | ICD-10-CM

## 2024-11-19 DIAGNOSIS — I10 BENIGN ESSENTIAL HYPERTENSION: ICD-10-CM

## 2024-11-19 DIAGNOSIS — Z00.00 ENCOUNTER FOR ANNUAL PHYSICAL EXAM: Primary | ICD-10-CM

## 2024-11-19 DIAGNOSIS — I25.5 ISCHEMIC CARDIOMYOPATHY: ICD-10-CM

## 2024-11-19 DIAGNOSIS — E11.9 DIABETES MELLITUS TYPE II, NON INSULIN DEPENDENT (MULTI): ICD-10-CM

## 2024-11-19 DIAGNOSIS — E55.9 VITAMIN D DEFICIENCY: ICD-10-CM

## 2024-11-19 PROBLEM — J40 BRONCHITIS: Status: RESOLVED | Noted: 2024-07-01 | Resolved: 2024-11-19

## 2024-11-19 PROBLEM — R15.9 INCONTINENT OF FECES: Status: RESOLVED | Noted: 2024-10-14 | Resolved: 2024-11-19

## 2024-11-19 PROBLEM — R32 INCONTINENT OF URINE: Status: RESOLVED | Noted: 2024-10-14 | Resolved: 2024-11-19

## 2024-11-19 PROBLEM — B37.9 YEAST INFECTION: Status: RESOLVED | Noted: 2024-10-14 | Resolved: 2024-11-19

## 2024-11-19 PROBLEM — K08.89 TOOTHACHE: Status: RESOLVED | Noted: 2024-10-18 | Resolved: 2024-11-19

## 2024-11-19 PROBLEM — R21 RASH: Status: RESOLVED | Noted: 2024-10-14 | Resolved: 2024-11-19

## 2024-11-19 PROBLEM — M54.2 CERVICAL SPINE PAIN: Status: RESOLVED | Noted: 2024-10-14 | Resolved: 2024-11-19

## 2024-11-19 PROBLEM — R20.0 LEFT ARM NUMBNESS: Status: RESOLVED | Noted: 2024-10-14 | Resolved: 2024-11-19

## 2024-11-19 PROBLEM — K04.7 DENTAL INFECTION: Status: RESOLVED | Noted: 2018-06-09 | Resolved: 2024-11-19

## 2024-11-19 PROBLEM — L03.90 CELLULITIS: Status: RESOLVED | Noted: 2024-10-14 | Resolved: 2024-11-19

## 2024-11-19 PROBLEM — S19.9XXA INJURY OF NECK: Status: RESOLVED | Noted: 2024-10-18 | Resolved: 2024-11-19

## 2024-11-19 PROBLEM — G93.41 METABOLIC ENCEPHALOPATHY: Status: RESOLVED | Noted: 2024-10-14 | Resolved: 2024-11-19

## 2024-11-19 PROBLEM — F13.20 BENZODIAZEPINE DEPENDENCE (MULTI): Status: RESOLVED | Noted: 2022-06-26 | Resolved: 2024-11-19

## 2024-11-19 PROBLEM — H53.8 BLURRED VISION: Status: RESOLVED | Noted: 2024-10-14 | Resolved: 2024-11-19

## 2024-11-19 PROBLEM — K21.9 ESOPHAGEAL REFLUX: Status: RESOLVED | Noted: 2024-10-14 | Resolved: 2024-11-19

## 2024-11-19 PROBLEM — F32.A DEPRESSION: Status: RESOLVED | Noted: 2021-03-23 | Resolved: 2024-11-19

## 2024-11-19 PROBLEM — E66.9 OBESITY, UNSPECIFIED: Status: RESOLVED | Noted: 2024-10-14 | Resolved: 2024-11-19

## 2024-11-19 PROBLEM — F90.1 ADHD (ATTENTION DEFICIT HYPERACTIVITY DISORDER), PREDOMINANTLY HYPERACTIVE IMPULSIVE TYPE: Status: RESOLVED | Noted: 2024-10-14 | Resolved: 2024-11-19

## 2024-11-19 PROBLEM — L02.91 ABSCESS: Status: RESOLVED | Noted: 2024-10-14 | Resolved: 2024-11-19

## 2024-11-19 PROBLEM — S42.001A CLOSED FRACTURE OF RIGHT CLAVICLE: Status: RESOLVED | Noted: 2021-07-06 | Resolved: 2024-11-19

## 2024-11-19 PROBLEM — F41.9 ANXIETY: Status: RESOLVED | Noted: 2021-07-06 | Resolved: 2024-11-19

## 2024-11-19 PROBLEM — L73.9 FOLLICULITIS: Status: RESOLVED | Noted: 2024-10-14 | Resolved: 2024-11-19

## 2024-11-19 PROBLEM — I63.9 CEREBRAL INFARCTION, UNSPECIFIED: Status: RESOLVED | Noted: 2024-10-14 | Resolved: 2024-11-19

## 2024-11-19 PROBLEM — F43.23 ADJUSTMENT DISORDER WITH MIXED ANXIETY AND DEPRESSED MOOD: Status: RESOLVED | Noted: 2021-12-02 | Resolved: 2024-11-19

## 2024-11-19 PROBLEM — K04.7 DENTAL ABSCESS: Status: RESOLVED | Noted: 2018-06-09 | Resolved: 2024-11-19

## 2024-11-19 PROBLEM — M54.9 BACK PAIN: Status: RESOLVED | Noted: 2024-10-14 | Resolved: 2024-11-19

## 2024-11-19 LAB — POC FINGERSTICK BLOOD GLUCOSE: 256 MG/DL (ref 70–100)

## 2024-11-19 PROCEDURE — 3051F HG A1C>EQUAL 7.0%<8.0%: CPT | Performed by: INTERNAL MEDICINE

## 2024-11-19 PROCEDURE — 3048F LDL-C <100 MG/DL: CPT | Performed by: INTERNAL MEDICINE

## 2024-11-19 PROCEDURE — 93000 ELECTROCARDIOGRAM COMPLETE: CPT | Performed by: INTERNAL MEDICINE

## 2024-11-19 PROCEDURE — 99214 OFFICE O/P EST MOD 30 MIN: CPT | Performed by: INTERNAL MEDICINE

## 2024-11-19 PROCEDURE — 82962 GLUCOSE BLOOD TEST: CPT | Performed by: INTERNAL MEDICINE

## 2024-11-19 PROCEDURE — 99396 PREV VISIT EST AGE 40-64: CPT | Performed by: INTERNAL MEDICINE

## 2024-11-19 RX ORDER — GLIMEPIRIDE 1 MG/1
1 TABLET ORAL
Qty: 100 TABLET | Refills: 3 | Status: SHIPPED | OUTPATIENT
Start: 2024-11-19 | End: 2025-12-24

## 2024-11-19 RX ORDER — METOPROLOL TARTRATE 100 MG/1
100 TABLET ORAL DAILY
Qty: 30 TABLET | Refills: 11 | Status: SHIPPED | OUTPATIENT
Start: 2024-11-19 | End: 2025-11-19

## 2024-11-19 ASSESSMENT — ENCOUNTER SYMPTOMS
DEPRESSION: 0
OCCASIONAL FEELINGS OF UNSTEADINESS: 0
LOSS OF SENSATION IN FEET: 0

## 2024-11-19 NOTE — ASSESSMENT & PLAN NOTE
Stress test and Echocardiogram reviewed  Follows up with cardiologist for further management, considering stent placement

## 2024-11-19 NOTE — ASSESSMENT & PLAN NOTE
Current /104. Denies headache, blurry vision, shortness or breath, chest pain. Patient reports medication adherence.   Advised on the importance of home BP monitoring. Reinforced lifestyle modifications including physical activity, dietary efforts, and low sodium diet. Follow-up blood pressure check at next visit.    Increase metoprolol to 100mg

## 2024-11-19 NOTE — ASSESSMENT & PLAN NOTE
Reviewed labs with patient     A1c 7.8  Continue jardiance and add glimepiride  Reinforced continued lifestyle modifications including dietary and physical efforts.

## 2024-11-19 NOTE — PROGRESS NOTES
ANNUAL WELLNESS VISIT    Subjective :  Chief Complaint: Anisha Ribera is an 43 y.o. male here for an annual wellness visit and general medical care and f/u.     HPI:  42 yo male presents for AWV and stress test and echo results  Pmhx: MI, stroke, HTN. CHF    Stress test:   IMPRESSION:  1. There is a moderate-sized reversible perfusion defect in the  inferior and inferoseptal walls which improves on prone imaging  suggesting soft tissue attenuation although can not completely rule  out ischemia given significant LV dysfunction.      2. Calculated ejection fraction of 16% with global hypokinesis.  Objective   Pulse 71   Resp 12   Wt 78.5 kg (173 lb)   SpO2 96%   BMI 27.92 kg/m²     Physical Exam  Constitutional:       Appearance: Normal appearance.   HENT:      Head: Normocephalic and atraumatic.      Right Ear: External ear normal.      Left Ear: External ear normal.      Nose: Nose normal.   Eyes:      Extraocular Movements: Extraocular movements intact.   Cardiovascular:      Rate and Rhythm: Normal rate and regular rhythm.      Heart sounds: Normal heart sounds.   Pulmonary:      Effort: Pulmonary effort is normal. No respiratory distress.      Breath sounds: Normal breath sounds. No wheezing.   Musculoskeletal:      Cervical back: Normal range of motion and neck supple.   Skin:     General: Skin is warm and dry.   Neurological:      General: No focal deficit present.      Mental Status: He is alert.   Psychiatric:         Mood and Affect: Mood normal.         Behavior: Behavior normal.         Thought Content: Thought content normal.         Judgment: Judgment normal.         Imaging:  Transthoracic Echo Complete    Result Date: 11/7/2024              [place] [id number]      Phone [phone] Fax [phone] TRANSTHORACIC ECHOCARDIOGRAM REPORT Patient Name:       ANISHA RIBERA      Reading Physician:    [id number] Lamb                                                                Lizbet CHAN Study Date:         11/6/2024           Ordering Provider:    [id number] LAWSON WINTERS MRN/PID:            [id number]            Fellow: Accession#:         [id number]        Nurse: Date of Birth/Age:  1980 / 43     Sonographer:          Blanquita Garcia RDCS                     years Gender Assigned at                     Additional Staff: Birth: Height:             167.64 cm           Admit Date: Weight:             82.56 kg            Admission Status:     Outpatient BSA / BMI:          1.92 m2 / 29.38     Department Location:  [facility] Echo                     kg/m2                                     Lab Blood Pressure: 150 /88 mmHg Study Type:    TRANSTHORACIC ECHO (TTE) COMPLETE Diagnosis/ICD: Acute on chronic combined systolic (congestive) and diastolic                (congestive) heart failure (CHF)-I50.43; Heart failure,                unspecified-I50.9 Indication:    Congestive Heart Failure CPT Codes:     Echo Complete w Full Doppler-[id number] Patient History: Smoker:            Current. Diabetes:          Yes Pertinent History: CHF, HTN and CVA. Study Detail: The following Echo studies were performed: 2D, M-Mode, Doppler and               color flow.  PHYSICIAN INTERPRETATION: Left Ventricle: The left ventricular systolic function is moderately decreased, with a Head's biplane calculated ejection fraction of 38%. There is mild eccentric left ventricular hypertrophy. There is global hypokinesis of the left ventricle with minor regional variations. The left ventricular cavity size is mildly dilated. There is mild increased septal and mildly increased posterior left ventricular wall thickness. Spectral Doppler shows a Grade II (pseudonormal pattern) of left ventricular diastolic filling. Left Atrium: The left atrium is moderately dilated. Increase in LA volume. Right Ventricle: The right ventricle is  moderately enlarged. There is reduced right ventricular systolic function. Right Atrium: The right atrium is mildly dilated. Aortic Valve: The aortic valve is trileaflet. There is no evidence of aortic valve stenosis. There is no evidence of aortic valve regurgitation. Mitral Valve: The mitral valve is mildly thickened. There is mild mitral annular calcification. There is trace to mild mitral valve regurgitation. Tricuspid Valve: The tricuspid valve is structurally normal. There is moderate to severe tricuspid regurgitation. The tricuspid valve regurgitant jet flows toward the atrial septum. The Doppler estimated RVSP is moderately elevated right ventricular systolic pressure at 45.9 mmHg. Pulmonic Valve: The pulmonic valve is structurally normal. There is physiologic pulmonic valve regurgitation. Pericardium: No pericardial effusion noted. Aorta: The aortic root is normal. Pulmonary Artery: The Doppler estimated pulmonary artery diastolic pressure is 17.6 mmHg. Systemic Veins: The inferior vena cava appears dilated.  CONCLUSIONS:  1. The left ventricular systolic function is moderately decreased, with a Head's biplane calculated ejection fraction of 38%.  2. There is global hypokinesis of the left ventricle with minor regional variations.  3. Spectral Doppler shows a Grade II (pseudonormal pattern) of left ventricular diastolic filling.  4. Left ventricular cavity size is mildly dilated.  5. There is reduced right ventricular systolic function.  6. Moderately enlarged right ventricle.  7. The left atrium is moderately dilated.  8. Moderate to severe tricuspid regurgitation.  9. Moderately elevated right ventricular systolic pressure. 10. Aortic valve stenosis is not present. QUANTITATIVE DATA SUMMARY:  2D MEASUREMENTS:            Normal Ranges: Ao Root d:       3.20 cm    (2.0-3.7cm) LAs:             4.70 cm    (2.7-4.0cm) IVSd:            1.25 cm    (0.6-1.1cm) LVPWd:           1.15 cm    (0.6-1.1cm) LVIDd:            5.80 cm    (3.9-5.9cm) LVIDs:           4.80 cm LV Mass Index:   172.7 g/m2 LV % FS          17.2 %  LA VOLUME:                    Normal Ranges: LA Vol A4C:        88.4 ml    (22+/-6mL/m2) LA Vol A2C:        89.8 ml LA Vol BP:         89.8 ml LA Vol Index A4C:  46.0ml/m2 LA Vol Index A2C:  46.7 ml/m2 LA Vol Index BP:   46.7 ml/m2 LA Area A4C:       26.0 cm2 LA Area A2C:       26.0 cm2 LA Major Axis A4C: 6.5 cm LA Major Axis A2C: 6.4 cm LA Volume Index:   47.0 ml/m2  RA VOLUME BY A/L METHOD:          Normal Ranges: RA Area A4C:             23.5 cm2  M-MODE MEASUREMENTS:         Normal Ranges: Ao Root:             2.90 cm (2.0-3.7cm) AoV Exc:             2.10 cm (1.5-2.5cm) LAs:                 5.50 cm (2.7-4.0cm)  AORTA MEASUREMENTS:         Normal Ranges: AoV Exc:            2.10 cm (1.5-2.5cm) Ao Sinus, d:        3.20 cm (2.1-3.5cm) Asc Ao, d:          3.40 cm (2.1-3.4cm)  LV SYSTOLIC FUNCTION BY 2D PLANIMETRY (MOD):                      Normal Ranges: EF-A4C View:    36 % (>=55%) EF-A2C View:    36 % EF-Biplane:     38 % LV EF Reported: 38 %  LV DIASTOLIC FUNCTION:           Normal Ranges: MV Peak E:             1.10 m/s  (0.7-1.2 m/s) MV e'                  0.090 m/s (>8.0) MV lateral e'          0.11 m/s MV medial e'           0.07 m/s E/e' Ratio:            12.29     (<8.0) LV IVRT:               87 msec   (<100ms)  MITRAL VALVE:          Normal Ranges: MV DT:        145 msec (150-240msec)  AORTIC VALVE:           Normal Ranges: LVOT Max Han:  1.08 m/s (<=1.1m/s) LVOT VTI:      17.60 cm LVOT Diameter: 2.20 cm  (1.8-2.4cm)  RIGHT VENTRICLE: RV Basal 4.80 cm RV Mid   3.80 cm RV Major 7.9 cm TAPSE:   18.0 mm RV s'    0.12 m/s  TRICUSPID VALVE/RVSP:          Normal Ranges: Peak TR Velocity:     3.08 m/s RV Syst Pressure:     46 mmHg  (< 30mmHg) TV E Vmax:            0.52 m/s (0.3-0.7m/s) IVC Diam:             2.34 cm  PULMONIC VALVE:           Normal Ranges: PV Max Han:     1.1 m/s   (0.6-0.9m/s) PV Max PG:       4.9 mmHg PIEDV:          1.55 m/s PADP:           17.6 mmHg  [id number] Adonis Somers MD Electronically signed on 11/7/2024 at 2:31:46 PM  ** Final **     Nuclear Stress Test    Result Date: 11/7/2024  Interpreted By:  Darvin Ron, STUDY: NUCLEAR STRESS TEST;  11/7/2024 9:50 am   INDICATION: Signs/Symptoms:recent MI , suspected Heart failure.   COMPARISON: None.   ACCESSION NUMBER(S): [id number]   ORDERING CLINICIAN: LAWSON WINTERS   TECHNIQUE: DIVISION OF NUCLEAR MEDICINE PHARMACOLOGIC STRESS MYOCARDIAL PERFUSION SCAN, ONE DAY PROTOCOL   The patient received an intravenous dose of  8.8 mCi of Tc-99m tetrofosmin and resting emission tomographic (SPECT) images of the myocardium were acquired. The patient then received an intravenous infusion of 0.4mg regadenoson (Lexiscan) followed by an additional dose of  31.0 mCi of Tc-99m tetrofosmin. Stress phase SPECT images of the myocardium were then acquired. These included ECG-gated images to assess and quantify ventricular function.   FINDINGS: There is a moderate-sized reversible perfusion defect in the inferior and inferoseptal walls which improves on prone imaging suggesting soft tissue attenuation although can not completely rule out ischemia given significant LV dysfunction.   Calculated ejection fraction of 16% with global hypokinesis.       1. There is a moderate-sized reversible perfusion defect in the inferior and inferoseptal walls which improves on prone imaging suggesting soft tissue attenuation although can not completely rule out ischemia given significant LV dysfunction.   2. Calculated ejection fraction of 16% with global hypokinesis.   Signed by: Darvin Ron 11/7/2024 1:13 PM Dictation workstation:   [id number]    Cardiology Interpretation Of Nuclear Stress - See Other Report For Nuclear Portion    Result Date: 11/7/2024              [place] [address]      Phone [phone] Fax [phone] Nuclear  Pharmacologic Stress Test Patient Name:      ANISHA RIBERA      Ordering Provider:     [id number] LAWSON WINTERS Study Date:        11/7/2024           Reading Physician:     [id number] Darvin Ron DO MRN/PID:           [id number]            Supervising Physician: [id number] Darvin Ron DO Accession#:        [id number]        Fellow: Date of Birth/Age: 1980 / 43     Fellow:                    years Gender:            M                   Nurse:                 Radha Laura RN Admission Status:  Outpatient          Sonographer:           na Height:            167.64 cm           Technologist: Weight:            83.92 kg            Additional Staff: BSA:               1.93 m2             Encounter#:            [id number] BMI:               29.86 kg/m2         Patient Location:      [facility] Study Type:    CARDIOLOGY INTERPRETATION OF NUCLEAR STRESS Diagnosis/ICD: Heart failure, unspecified-I50.9; NonST elevation (NSTEMI)                myocardial infarction-I21.4 Indication:    suspected heart failure, recent MI CPT Codes:     Stress Test Interpretation-86313; Stress Test Supervision-[id number] Falls Risk:  Study Details: Correct procedure and correct patient verified verbally and with                ID Band checked.  Patient History: Congestive heart failure, hypertension and MI, CVA. Allergies: PCN, vicodin, bee stings, iodine.  Medications: Lasix, metoprolol, clindamyacin. The patient did not take medications as prescribed.  Patient Performance: Patient received a total of 0.4 mg of Regadenoson at 9:14:51 AM. The resting blood pressure was 123/80 mmHg with a heart rate of 94 bpm. The patient developed no symptoms during the stress exam. The blood pressure response was normal. The test was terminated due to: completed lab protocol. Patient has  met the discharge criteria and is discharged to home.  Baseline ECG: Resting ECG showed normal sinus rhythm with left atrial enlargement, right atrial enlargement and T wave abnormality, consider inferolaateral ischemia, prolonged QT.  Stress ECG: Stress ECG showed sinus rhythm. No ST changes from baseline.  Stress Stage Data: +----------------+--+------+-------+                 HRSys BPDias BP +----------------+--+------+-------+ Baseline [id number]   80      +----------------+--+------+-------+ Stage I         11586   97      +----------------+--+------+-------+ Stage II        13841   96      +----------------+--+------+-------+ Stage III       19557   97      +----------------+--+------+-------+ Stage IV        87964   96      +----------------+--+------+-------+ Stage V         21776   96      +----------------+--+------+-------+  Recovery ECG: Recovery ECG showed normal sinus rhythm, with no abnormal findings.  Summary:  1. Correlate with myocardial perfusion imaging results.  2. No ECG changes from baseline.  3. Nuclear image results are reported separately. [id number] Darvin Ron DO Electronically signed on 11/7/2024 at 11:03:45 AM   ** Final **     ECG 12 lead (Clinic Performed)    Result Date: 11/6/2024  Sinus tachycardia with Premature atrial complexes Possible Left atrial enlargement Incomplete left bundle branch block Nonspecific T wave abnormality Abnormal ECG When compared with ECG of 26-FEB-2015 20:02, Premature atrial complexes are now Present Incomplete left bundle branch block is now Present Inverted T waves have replaced nonspecific T wave abnormality in Lateral leads Confirmed by Tree Galindo ([id number]) on 11/6/2024 12:19:22 PM       Labs reviewed:    Lab Results   Component Value Date    WBC 7.8 10/22/2024    HGB 15.2 10/22/2024    HCT 49.8 10/22/2024     10/22/2024    CHOL 123 10/22/2024    TRIG 47 10/22/2024    HDL 36.7 10/22/2024    ALT 11 10/22/2024     AST 15 10/22/2024     10/28/2024    K 3.3 (L) 10/28/2024    CL 97 (L) 10/28/2024    CREATININE 1.19 10/28/2024    BUN 17 10/28/2024    CO2 29 10/28/2024    TSH 1.33 10/22/2024    HGBA1C 7.8 (H) 10/22/2024       Past Medical, Surgical, and Family History reviewed and updated in chart.    I have reviewed and reconciled the medication list with the patient today.   Current Outpatient Medications:     empagliflozin (Jardiance) 25 mg, Take 1 tablet (25 mg) by mouth once daily., Disp: 30 tablet, Rfl: 11    furosemide (Lasix) 40 mg tablet, Take 1 tablet (40 mg) by mouth once daily., Disp: 30 tablet, Rfl: 11    sacubitriL-valsartan (Entresto) 24-26 mg tablet, Take 1 tablet by mouth 2 times a day., Disp: 60 tablet, Rfl: 0    spironolactone (Aldactone) 25 mg tablet, Take 1 tablet (25 mg) by mouth once daily., Disp: 30 tablet, Rfl: 11     List of current healthcare providers:  Patient Care Team:  Damari Rizo MD as PCP - General     HRA:       Steadi Fall Risk  One or more falls in the last year? No  How many Times?    Was the patient injured in the fall?    Has trouble stepping onto curb? No  Advised to use a cane or walker to get around safely? No  Often has to rush to toilet? No  Feels unsteady when walking? No  Has lost some feeling in feet? No  Often feels sad or depressed? No  Steadies self on furniture while walking at home? No  Takes medicine that makes them feel lightheaded or more tired than usual? No  Worried about Falling? No  Takes medicine to sleep or improve mood? No  Needs to push with hands when rising from a chair? No                                          Assessment/Plan :  Problem List Items Addressed This Visit       Benign essential hypertension     Current /104. Denies headache, blurry vision, shortness or breath, chest pain. Patient reports medication adherence.   Advised on the importance of home BP monitoring. Reinforced lifestyle modifications including physical activity, dietary  efforts, and low sodium diet. Follow-up blood pressure check at next visit.    Increase metoprolol to 100mg         Relevant Orders    ECG 12 lead (Clinic Performed)    Vitamin D deficiency     Reviewed labs vit: 14  Start vitamin d supplement         Congestive heart failure - Primary     Stress test and Echocardiogram reviewed  Follows up with cardiologist for further management, considering stent placement            Diabetes mellitus type II, non insulin dependent (Multi)     Reviewed labs with patient     A1c 7.8  Continue jardiance and add glimepiride  Reinforced continued lifestyle modifications including dietary and physical efforts.           Relevant Orders    POCT fingerstick glucose manually resulted (Completed)    Encounter for annual physical exam    Relevant Orders    ECG 12 lead (Clinic Performed)    POCT fingerstick glucose manually resulted (Completed)    Ischemic cardiomyopathy     The following health maintenance schedule was reviewed with the patient and provided in printed form in the after visit summary:  Health Maintenance   Topic Date Due    Medicare Annual Wellness Visit (AWV)  Never done    HIV Screening  Never done    MMR Vaccines (1 of 1 - Standard series) Never done    Pneumococcal Vaccine: Pediatrics (0 to 5 Years) and At-Risk Patients (6 to 64 Years) (1 of 2 - PCV) Never done    Diabetes: Retinopathy Screening  Never done    Varicella Vaccines (1 of 2 - 13+ 2-dose series) Never done    Hepatitis C Screening  Never done    Hepatitis B Vaccines (1 of 3 - 19+ 3-dose series) Never done    Diabetes: Urine Protein Screening  11/06/2021    DTaP/Tdap/Td Vaccines (1 - Tdap) 07/01/2023    Influenza Vaccine (1) Never done    COVID-19 Vaccine (1 - 2024-25 season) Never done    Diabetes: Hemoglobin A1C  01/22/2025    TSH Level  10/22/2025    Lipid Panel  10/22/2025    Creatinine Level  10/28/2025    Potassium Level  10/28/2025    Echocardiogram  11/06/2025    Zoster Vaccines (1 of 2) 12/19/2031     Medicare Initial Physical (IPPE)  Completed    HIB Vaccines  Aged Out    IPV Vaccines  Aged Out    Hepatitis A Vaccines  Aged Out    Meningococcal Vaccine  Aged Out    Rotavirus Vaccines  Aged Out    HPV Vaccines  Aged Out       Advance Care Planning   No living will             Orders Placed This Encounter   Procedures    POCT fingerstick glucose manually resulted     Order Specific Question:   Release result to [facility]     Answer:   Immediate [1]    ECG 12 lead (Clinic Performed)     Order Specific Question:   Reason for Exam:     Answer:   .  Physical       Continue current medications as listed  Follow up in 3 months

## 2024-12-02 PROBLEM — I50.20 HFREF (HEART FAILURE WITH REDUCED EJECTION FRACTION): Status: ACTIVE | Noted: 2024-11-15

## 2024-12-02 PROBLEM — R94.39 ABNORMAL STRESS TEST: Status: ACTIVE | Noted: 2024-12-02

## 2024-12-02 PROBLEM — Z86.73 HISTORY OF CVA (CEREBROVASCULAR ACCIDENT): Status: ACTIVE | Noted: 2024-12-02

## 2024-12-02 PROBLEM — Z72.0 TOBACCO USE: Status: ACTIVE | Noted: 2024-12-02

## 2024-12-02 NOTE — PROGRESS NOTES
[facility] V[facility] Cardiology    Patient Name: Candido Roman  Patient : 1980      Scribe Attestation  By signing my name below, I, Tye Contreras   attest that this documentation has been prepared under the direction and in the presence of Darvin Ron DO.      Reason for visit:  This is a 43-year-old male here for evaluation regarding HFrEF, abnormal stress, hypertension, diabetes mellitus, history of CVA, and tobacco use.        HPI:  This is a 43-year-old male here for evaluation regarding HFrEF, abnormal stress, hypertension, diabetes mellitus, history of CVA, and tobacco use.  The patient has never been evaluated by [facility] in the past but was previously seen by cardiology PA in 2024 at which time he reported feeling improved after restarting medical therapy.  It was recommended he have a heart catheterization once his medical therapy was maximized, cardiac MRI was ordered, blood work was ordered, and he was asked to follow-up in several days.  BMP done 10/28/2024 showed a serum sodium of 139, serum potassium 3.3, serum creatinine of 1.19, BNP was 1707.  Lipid panel done 10/22/2024 showed an LDL cholesterol 77 and triglycerides of 47 not currently on any lipid-lowering medical therapy.  Hemoglobin A1c done 10/22/2024 was 7.8%, TSH was 1.33, CBC showed a hemoglobin of 15.2.  Nuclear stress done 2024 showed a moderate size reversible perfusion defect in the inferior/inferoseptal walls which improved on prone imaging suggesting soft tissue attenuation although cannot completely rule out ischemia, calculated ejection fraction of 16%.  Echocardiogram done 2024 showed moderately decreased left ventricular systolic function with an ejection fraction of 38% and global hypokinesis, grade 2 diastolic dysfunction, reduced right ventricular systolic function, and moderate to severe tricuspid valve regurgitation. ECG done today showed sinus  tachycardia with a heart rate of 111 bpm.  The patient reports having shortness of breath on exertion. He denies any associated chest pain, palpitations and lightheadedness. He states that he sometimes misses his cardiac medications for 1 or 2 days, although he states that he took his medications this morning. He states that he realized yesterday that had been taking Entresto once daily instead on twice daily. He states that his furosemide had been decreased to 40 mg daily. He states that he takes his other medications as prescribed. During my exam, he was resting comfortably on the exam table.            Assessment/Plan:   Acute on chronic HFrEF  The patient has HFrEF.  Nuclear stress done 11/7/2024 showed a moderate size reversible perfusion defect in the inferior/inferoseptal walls which improved on prone imaging suggesting soft tissue attenuation although cannot completely rule out ischemia, calculated ejection fraction of 16%.    Echocardiogram done 11/6/2024 showed moderately decreased left ventricular systolic function with an ejection fraction of 38% and global hypokinesis, grade 2 diastolic dysfunction, reduced right ventricular systolic function, and moderate to severe tricuspid valve regurgitation.   The patient reports having shortness of breath on exertion.   He does have 1+ pitting bilateral lower extremity edema on exam today.  I will discontinue metoprolol tartrate and start metoprolol succinate 100 mg daily.  I will also increase Entresto to 100 mg twice daily.  I will discontinue furosemide and start torsemide 20 mg twice daily for 5 days then reduce to 20 mg daily.   He should continue his current dose of spironolactone and empagliflozin.  Recent lab works as noted in the HPI.  Discussed the risks and benefits of cardiac catheterization. He expressed understanding and agreed to undergo heart catheterization.  I will set him up for left/right heartc catheterization.   Lab works as noted below will  be done prior to cardiac catheterization.   I discussed with him the importance of following a low-sodium heart healthy diet.  Follow up in 1 month and sooner if necessary.     2. Abnormal stress test  Nuclear stress done 11/7/2024 showed a moderate size reversible perfusion defect in the inferior/inferoseptal walls which improved on prone imaging suggesting soft tissue attenuation although cannot completely rule out ischemia, calculated ejection fraction of 16%.   ECG done today showed sinus tachycardia with a heart rate of 111 bpm.    He denies anginal chest discomfort and shortness of breath.   Recent lab works as noted in the HPI.  Discussed the risks and benefits of cardiac catheterization. He expressed understanding and agreed to undergo heart catheterization.  I will set him up for left cardiac catheterization.   Lab works as noted below will be done prior to cardiac catheterization.   Continue risk factor modification.  Follow up in 1 month and sooner if necessary.     3. Hypertension  The patient has a history of hypertension which appears moderately controlled on exam today.  I will discontinue metoprolol tartrate and start metoprolol succinate 100 mg daily.  I will also increase Entresto to 100 mg twice daily.  He should continue his current antihypertensive medications and monitor his blood pressure at home.    4. Diabetes Mellitus  Hemoglobin A1c done 10/22/2024 was 7.8%  Medication management as tammy PCP.    5. History of CVA  Patient had a history of CVA.  Continue risk factor modification.  He should continue to follow up with his PCP and neurologist.     6. Tobacco use  The patient is a current smoker.  I discussed with him the importance of smoking cessation as well as several strategies to assist him in quitting smoking.         Orders:   Change metoprolol to tartrate to metoprolol succinate to 100 mg daily  Increase Entresto to 100 mg twice daily.   discontinue furosemide and start torsemide 20  mg twice daily for 5 days then reduce to 20 mg daily.   Schedule for left/right heart catheterization,   BNP/BMP/CBC/Mg prior to cardiac catheterization  Cardiac MRI had been previously scheduled.  Follow-up in 1 month.    Lifestyle Recommendations  I recommend a whole-food plant-based diet, an eating pattern that encourages the consumption of unrefined plant foods (such as fruits, vegetables, tubers, whole grains, legumes, nuts and seeds) and discourages meats, dairy products, eggs and processed foods.     The AHA/ACC recommends that the patient consume a dietary pattern that emphasizes intake of vegetables, fruits, and whole grains; includes low-fat dairy products, poultry, fish, legumes, non-tropical vegetable oils, and nuts; and limits intake of sodium, sweets, sugar-sweetened beverages, and red meats.  Adapt this dietary pattern to appropriate calorie requirements (a 500-750 kcal/day deficit to loose weight), personal and cultural food preferences, and nutrition therapy for other medical conditions (including diabetes).  Achieve this pattern by following plans such as the [place] Mediterranean, DASH dietary pattern, or AHA diet.     Engage in 2 hours and 30 minutes per week of moderate-intensity physical activity, or 1 hour and 15 minutes (75 minutes) per week of vigorous-intensity aerobic physical activity, or an equivalent combination of moderate and vigorous-intensity aerobic physical activity. Aerobic activity should be performed in episodes of at least 10 minutes preferably spread throughout the week.     Adhering to a heart healthy diet, regular exercise habits, avoidance of tobacco products, and maintenance of a healthy weight are crucial components of their heart disease risk reduction.     Any positive review of systems not specifically addressed in the office visit today should be evaluated and treated by the patients primary care physician or in an emergency department if necessary     Patient was  notified that results from ordered tests will be called to the patient if it changes current management; it will otherwise be discussed at a future appointment and available on [facility].     Thank you for allowing me to participate in the care of this patient.        This document was generated using the assistance of voice recognition software. If there are any errors of spelling, grammar, syntax, or meaning; please feel free to contact me directly for clarification.      Past Medical History:  He has a past medical history of Unspecified injury of neck, initial encounter.    Past Surgical History:  He has a past surgical history that includes Hand surgery (10/08/2014).      Social History:  He reports that he has been smoking cigarettes. He has never used smokeless tobacco. He reports that he does not currently use alcohol. He reports current drug use. Drug: Marijuana.    Family History:  No family history on file.     Allergies:  Iodine, Penicillins, Acetaminophen, Bee venom protein (honey bee), Hydrocodone bitartrate, Hydrocodone-acetaminophen, Iodides, Iodinated contrast media, and Nutritional supplements    Outpatient Medications:  Current Outpatient Medications   Medication Instructions    furosemide (LASIX) 40 mg, oral, Daily    glimepiride (AMARYL) 1 mg, oral, Daily before breakfast    Jardiance 25 mg, oral, Daily    metoprolol tartrate (LOPRESSOR) 100 mg, oral, Daily    sacubitriL-valsartan (Entresto) 24-26 mg tablet 1 tablet, oral, 2 times daily    spironolactone (ALDACTONE) 25 mg, oral, Daily        ROS:  A 14 point review of systems was done and is negative other than as stated in HPI    Vitals:      3/5/2020     1:08 PM 8/3/2020    12:43 PM 10/23/2020     1:34 PM 10/14/2024    11:05 AM 10/18/2024    12:39 PM 10/28/2024    11:17 AM 11/19/2024     2:22 PM   Vitals   Systolic 120 114 124  138 150    Diastolic 88 88 76  85 88    Heart Rate 84 88 96 112 74 108 71   Resp 12 12 12 14 12  12   Height 1.676 m  "(5' 6\") 1.676 m (5' 6\") 1.676 m (5' 6\")   1.676 m (5' 6\")    Weight (lb) 225 211 208  184 182 173   BMI 36.32 kg/m2 34.06 kg/m2 33.57 kg/m2  29.7 kg/m2 29.38 kg/m2 27.92 kg/m2   BSA (m2) 2.18 m2 2.11 m2 2.1 m2  1.97 m2 1.96 m2 1.91 m2        Physical Exam:   Constitutional: Cooperative, in no acute distress, alert, appears stated age.  Skin: Skin color, texture, turgor normal. No rashes or lesions.  Head: Normocephalic. No masses, lesions, tenderness or abnormalities  Eyes: Extraocular movements are grossly intact.  Mouth and throat: Mucous membranes moist  Neck: Neck supple, no carotid bruits, no JVD  Respiratory: Lungs clear to auscultation, no wheezing or rhonchi, no use of accessory muscles  Chest wall: No scars, normal excursion with respiration  Cardiovascular: Tachycardic, regular rhythm without murmur  Gastrointestinal: Abdomen soft, nontender. Bowel sounds normal.  Musculoskeletal: Strength equal in upper extremities  Extremities: 1+ pitting edema  Neurologic: Sensation grossly intact, alert and oriented ×3    Intake/Output:   No intake/output data recorded.    Outpatient Medications  Current Outpatient Medications on File Prior to Visit   Medication Sig Dispense Refill    empagliflozin (Jardiance) 25 mg Take 1 tablet (25 mg) by mouth once daily. 30 tablet 11    furosemide (Lasix) 40 mg tablet Take 1 tablet (40 mg) by mouth once daily. 30 tablet 11    glimepiride (AmaryL) 1 mg tablet Take 1 tablet (1 mg) by mouth once daily in the morning. Take before meals. 100 tablet 3    metoprolol tartrate (Lopressor) 100 mg tablet Take 1 tablet (100 mg) by mouth once daily. 30 tablet 11    sacubitriL-valsartan (Entresto) 24-26 mg tablet Take 1 tablet by mouth 2 times a day. 60 tablet 0    spironolactone (Aldactone) 25 mg tablet Take 1 tablet (25 mg) by mouth once daily. 30 tablet 11     No current facility-administered medications on file prior to visit.       Labs: (past 26 weeks)  Recent Results (from the past 26 " weeks)   CBC    Collection Time: 10/22/24  9:19 AM   Result Value Ref Range    WBC 7.8 4.4 - 11.3 x10*3/uL    nRBC 0.0 0.0 - 0.0 /100 WBCs    RBC 6.07 (H) 4.50 - 5.90 x10*6/uL    Hemoglobin 15.2 13.5 - 17.5 g/dL    Hematocrit 49.8 41.0 - 52.0 %    MCV 82 80 - 100 fL    MCH 25.0 (L) 26.0 - 34.0 pg    MCHC 30.5 (L) 32.0 - 36.0 g/dL    RDW 19.3 (H) 11.5 - 14.5 %    Platelets 283 150 - 450 x10*3/uL   Comprehensive Metabolic Panel    Collection Time: 10/22/24  9:19 AM   Result Value Ref Range    Glucose 232 (H) 74 - 99 mg/dL    Sodium 138 136 - 145 mmol/L    Potassium 3.8 3.5 - 5.3 mmol/L    Chloride 97 (L) 98 - 107 mmol/L    Bicarbonate 28 21 - 32 mmol/L    Anion Gap 17 10 - 20 mmol/L    Urea Nitrogen 10 6 - 23 mg/dL    Creatinine 1.16 0.50 - 1.30 mg/dL    eGFR 81 >60 mL/min/1.73m*2    Calcium 8.8 8.6 - 10.6 mg/dL    Albumin 3.4 3.4 - 5.0 g/dL    Alkaline Phosphatase 154 (H) 33 - 120 U/L    Total Protein 6.4 6.4 - 8.2 g/dL    AST 15 9 - 39 U/L    Bilirubin, Total 1.3 (H) 0.0 - 1.2 mg/dL    ALT 11 10 - 52 U/L   Lipid Panel    Collection Time: 10/22/24  9:19 AM   Result Value Ref Range    Cholesterol 123 0 - 199 mg/dL    HDL-Cholesterol 36.7 mg/dL    Cholesterol/HDL Ratio 3.4     LDL Calculated 77 <=99 mg/dL    VLDL 9 0 - 40 mg/dL    Triglycerides 47 0 - 149 mg/dL    Non HDL Cholesterol 86 0 - 149 mg/dL   Hemoglobin A1C    Collection Time: 10/22/24  9:19 AM   Result Value Ref Range    Hemoglobin A1C 7.8 (H) See comment %    Estimated Average Glucose 177 Not Established mg/dL   Thyroid Stimulating Hormone    Collection Time: 10/22/24  9:19 AM   Result Value Ref Range    Thyroid Stimulating Hormone 1.33 0.44 - 3.98 mIU/L   Vitamin D 25-Hydroxy,Total (for eval of Vitamin D levels)    Collection Time: 10/22/24  9:19 AM   Result Value Ref Range    Vitamin D, 25-Hydroxy, Total 14 (L) 30 - 100 ng/mL   Prostate Specific Antigen, Screen    Collection Time: 10/22/24  9:19 AM   Result Value Ref Range    Prostate Specific  Antigen,Screen 0.69 <=4.00 ng/mL   D-Dimer, Non VTE    Collection Time: 10/22/24  9:19 AM   Result Value Ref Range    D-Dimer Non VTE, Quant (ng/mL FEU) 2,012 (H) <=500 ng/mL FEU   Troponin I, High Sensitivity    Collection Time: 10/22/24  9:19 AM   Result Value Ref Range    Troponin I, High Sensitivity (CMC) 82 (H) 0 - 53 ng/L   ECG 12 lead (Clinic Performed)    Collection Time: 10/28/24 11:08 AM   Result Value Ref Range    Ventricular Rate 106 BPM    Atrial Rate 106 BPM    AK Interval 168 ms    QRS Duration 108 ms    QT Interval 372 ms    QTC Calculation(Bazett) 494 ms    P Axis 50 degrees    R Axis -11 degrees    T Axis 66 degrees    QRS Count 17 beats    Q Onset 218 ms    P Onset 134 ms    P Offset 198 ms    T Offset 404 ms    QTC Fredericia 449 ms   B-Type Natriuretic Peptide    Collection Time: 10/28/24 11:56 AM   Result Value Ref Range    BNP 1,707 (H) 0 - 99 pg/mL   Basic metabolic panel    Collection Time: 10/28/24 11:56 AM   Result Value Ref Range    Glucose 193 (H) 74 - 99 mg/dL    Sodium 139 136 - 145 mmol/L    Potassium 3.3 (L) 3.5 - 5.3 mmol/L    Chloride 97 (L) 98 - 107 mmol/L    Bicarbonate 29 21 - 32 mmol/L    Anion Gap 16 10 - 20 mmol/L    Urea Nitrogen 17 6 - 23 mg/dL    Creatinine 1.19 0.50 - 1.30 mg/dL    eGFR 78 >60 mL/min/1.73m*2    Calcium 8.5 (L) 8.6 - 10.6 mg/dL   Transthoracic Echo Complete    Collection Time: 11/06/24 11:57 AM   Result Value Ref Range    LVOT diam 2.20 cm    LV Biplane EF 38 %    MV avg E/e' ratio 9.90     Tricuspid annular plane systolic excursion 1.8 cm    LA vol index A/L 46.7 ml/m2    LV EF 38 %    RV free wall pk S' 12.00 cm/s    RVSP 45.9 mmHg    LVIDd 5.80 cm    LV A4C EF 36.1    POCT fingerstick glucose manually resulted    Collection Time: 11/19/24  2:30 PM   Result Value Ref Range    POC Fingerstick Blood Glucose 256 (A) 70 - 100 mg/dl         CV Studies:  EKG:  Encounter Date: 11/19/24   ECG 12 lead (Clinic Performed)    Narrative    Sinus rhythm  LVH  Early  repolarization     Echocardiogram: No results found for this or any previous visit from the past 1825 days.    Stress Testing IMGRESULT([id number]:1:1825): No results found for this or any previous visit from the past 1825 days.    Cardiac Catheterization: No results found for this or any previous visit from the past 1825 days.  No results found for this or any previous visit from the past 3650 days.     Cardiac Scoring: No results found for this or any previous visit from the past 1825 days.    AAA : No results found for this or any previous visit from the past 1825 days.    OTHER: No results found for this or any previous visit from the past 1825 days.    LAST IMAGING RESULTS      Problem List Items Addressed This Visit       Benign essential hypertension    Diabetes mellitus type II, non insulin dependent (Multi)    HFrEF (heart failure with reduced ejection fraction) - Primary    Abnormal stress test    History of CVA (cerebrovascular accident)    Tobacco use            Darvin Ron DO, FACC, FACOI

## 2024-12-06 ENCOUNTER — TELEPHONE (OUTPATIENT)
Dept: CARDIOLOGY | Facility: HOSPITAL | Age: 44
End: 2024-12-06

## 2024-12-06 ENCOUNTER — APPOINTMENT (OUTPATIENT)
Dept: CARDIOLOGY | Facility: CLINIC | Age: 44
End: 2024-12-06
Payer: COMMERCIAL

## 2024-12-06 VITALS
BODY MASS INDEX: 29.35 KG/M2 | WEIGHT: 182.6 LBS | HEIGHT: 66 IN | DIASTOLIC BLOOD PRESSURE: 72 MMHG | HEART RATE: 111 BPM | SYSTOLIC BLOOD PRESSURE: 148 MMHG

## 2024-12-06 DIAGNOSIS — I10 BENIGN ESSENTIAL HYPERTENSION: ICD-10-CM

## 2024-12-06 DIAGNOSIS — Z72.0 TOBACCO USE: ICD-10-CM

## 2024-12-06 DIAGNOSIS — E11.9 DIABETES MELLITUS TYPE II, NON INSULIN DEPENDENT (MULTI): ICD-10-CM

## 2024-12-06 DIAGNOSIS — Z86.73 HISTORY OF CVA (CEREBROVASCULAR ACCIDENT): ICD-10-CM

## 2024-12-06 DIAGNOSIS — I50.20 HFREF (HEART FAILURE WITH REDUCED EJECTION FRACTION): Primary | ICD-10-CM

## 2024-12-06 DIAGNOSIS — R94.39 ABNORMAL STRESS TEST: ICD-10-CM

## 2024-12-06 DIAGNOSIS — I50.22 CHRONIC SYSTOLIC HEART FAILURE: ICD-10-CM

## 2024-12-06 RX ORDER — TORSEMIDE 20 MG/1
20 TABLET ORAL DAILY
Qty: 90 TABLET | Refills: 3 | Status: SHIPPED | OUTPATIENT
Start: 2024-12-06 | End: 2025-12-06

## 2024-12-06 RX ORDER — METOPROLOL SUCCINATE 100 MG/1
100 TABLET, EXTENDED RELEASE ORAL DAILY
Qty: 90 TABLET | Refills: 3 | Status: SHIPPED | OUTPATIENT
Start: 2024-12-06 | End: 2025-12-06

## 2024-12-06 NOTE — TELEPHONE ENCOUNTER
RN called patient to review heart cath instructions, no answer, left message for patient to return call at [phone]

## 2024-12-06 NOTE — TELEPHONE ENCOUNTER
12/27/24     Arrive 630a   Procedure 730a     Called cath instructions to patient including review of date and arrival time.  Verified no need for dye prep.  Labs reviewed.  Nothing to eat or drink after midnight except Metoprolol with a sip of water the morning of the cath.  You will need a .  Bring your ID, insurance cards and either a perfect list of the medications you are swallowing or the medications in original bottles.  Wear comfortable clothing.  There is a possibility of staying over night for observation so pack a bag with necessities for that.  Patient correctly repeated instructions, verbalized understanding and is agreeable to the plan.     RN called pt at this time no answer, RN left message for patient to call back.

## 2024-12-06 NOTE — TELEPHONE ENCOUNTER
----- Message from Sofi HARRIS sent at 12/6/2024  9:39 AM EST -----  Regarding: BP patient scheduled with [facility] for L&R heart cath 12/27/24  BP patient scheduled with [facility] for a L&R heart cath for 12/27/24    Please call with instructions     Arrive 630a  Procedure 730a    He is aware to get labs done prior to procedure

## 2024-12-09 ENCOUNTER — APPOINTMENT (OUTPATIENT)
Dept: CARDIOLOGY | Facility: CLINIC | Age: 44
End: 2024-12-09
Payer: COMMERCIAL

## 2024-12-09 ENCOUNTER — TELEPHONE (OUTPATIENT)
Dept: CARDIOLOGY | Facility: HOSPITAL | Age: 44
End: 2024-12-09
Payer: COMMERCIAL

## 2024-12-10 ENCOUNTER — TELEPHONE (OUTPATIENT)
Dept: CARDIOLOGY | Facility: HOSPITAL | Age: 44
End: 2024-12-10
Payer: COMMERCIAL

## 2024-12-10 DIAGNOSIS — Z88.8 ALLERGY TO IODINE: Primary | ICD-10-CM

## 2024-12-10 RX ORDER — PREDNISONE 50 MG/1
TABLET ORAL
Qty: 3 TABLET | Refills: 0 | Status: SHIPPED | OUTPATIENT
Start: 2024-12-10 | End: 2024-12-20

## 2024-12-10 RX ORDER — FAMOTIDINE 20 MG/1
20 TABLET, FILM COATED ORAL ONCE AS NEEDED
Qty: 1 TABLET | Refills: 0 | Status: SHIPPED | OUTPATIENT
Start: 2024-12-10

## 2024-12-12 ENCOUNTER — TELEPHONE (OUTPATIENT)
Dept: CARDIOLOGY | Facility: HOSPITAL | Age: 44
End: 2024-12-12
Payer: COMMERCIAL

## 2024-12-12 NOTE — TELEPHONE ENCOUNTER
RN returned patient call reviewed heart cath instructions with patient. RN answered patients questions and patient verbalized understanding of the instructions.       Left and Right heart cath scheduled for 12/27/24      Arrival time 0630  Procedure time 0730         Verified need for dye prep including Prednisone 50mg 13hours/ 7hours/ 1 hour prior to procedure, Benadryl 50mg 1 hour prior to procedure and pepcid 20mg 1 hour prior to procedure. These medications were sent to your pharmacy and should be ready in 1-2 days.  Hold Jardiance for 3 days prior to procedure. Labs ordered please get them drawn.  Nothing to eat or drink after midnight except metoprolol, with a sip of water the morning of the cath.  You will need a .  Bring your photo ID, insurance cards and either a current list of home medications  or the medications in original bottles.  Wear comfortable loose fitting clothing.  There is a possibility of staying over night for observation so pack a bag with necessities for that

## 2024-12-13 ENCOUNTER — APPOINTMENT (OUTPATIENT)
Dept: PRIMARY CARE | Facility: CLINIC | Age: 44
End: 2024-12-13
Payer: COMMERCIAL

## 2024-12-17 ENCOUNTER — OFFICE VISIT (OUTPATIENT)
Dept: CARDIOLOGY | Facility: CLINIC | Age: 44
End: 2024-12-17
Payer: COMMERCIAL

## 2024-12-17 ENCOUNTER — APPOINTMENT (OUTPATIENT)
Dept: PRIMARY CARE | Facility: CLINIC | Age: 44
End: 2024-12-17
Payer: COMMERCIAL

## 2024-12-17 ENCOUNTER — LAB (OUTPATIENT)
Dept: LAB | Facility: LAB | Age: 44
End: 2024-12-17
Payer: COMMERCIAL

## 2024-12-17 VITALS
HEIGHT: 65 IN | SYSTOLIC BLOOD PRESSURE: 131 MMHG | OXYGEN SATURATION: 98 % | WEIGHT: 166 LBS | HEART RATE: 87 BPM | BODY MASS INDEX: 27.66 KG/M2 | DIASTOLIC BLOOD PRESSURE: 106 MMHG

## 2024-12-17 DIAGNOSIS — Z86.73 HISTORY OF CVA (CEREBROVASCULAR ACCIDENT): ICD-10-CM

## 2024-12-17 DIAGNOSIS — I50.20 HFREF (HEART FAILURE WITH REDUCED EJECTION FRACTION): ICD-10-CM

## 2024-12-17 DIAGNOSIS — I10 BENIGN ESSENTIAL HYPERTENSION: Primary | ICD-10-CM

## 2024-12-17 DIAGNOSIS — I10 BENIGN ESSENTIAL HYPERTENSION: ICD-10-CM

## 2024-12-17 DIAGNOSIS — R94.39 ABNORMAL STRESS TEST: ICD-10-CM

## 2024-12-17 DIAGNOSIS — I50.9 CONGESTIVE HEART FAILURE, UNSPECIFIED HF CHRONICITY, UNSPECIFIED HEART FAILURE TYPE: ICD-10-CM

## 2024-12-17 DIAGNOSIS — E11.9 DIABETES MELLITUS TYPE II, NON INSULIN DEPENDENT (MULTI): ICD-10-CM

## 2024-12-17 DIAGNOSIS — I50.22 CHRONIC SYSTOLIC HEART FAILURE: ICD-10-CM

## 2024-12-17 DIAGNOSIS — Z72.0 TOBACCO USE: Primary | ICD-10-CM

## 2024-12-17 DIAGNOSIS — I50.43 ACUTE ON CHRONIC COMBINED SYSTOLIC AND DIASTOLIC CONGESTIVE HEART FAILURE: ICD-10-CM

## 2024-12-17 DIAGNOSIS — Z72.0 TOBACCO USE: ICD-10-CM

## 2024-12-17 PROCEDURE — 3048F LDL-C <100 MG/DL: CPT

## 2024-12-17 PROCEDURE — 85027 COMPLETE CBC AUTOMATED: CPT

## 2024-12-17 PROCEDURE — 3080F DIAST BP >= 90 MM HG: CPT

## 2024-12-17 PROCEDURE — 99214 OFFICE O/P EST MOD 30 MIN: CPT

## 2024-12-17 PROCEDURE — 80053 COMPREHEN METABOLIC PANEL: CPT

## 2024-12-17 PROCEDURE — 83036 HEMOGLOBIN GLYCOSYLATED A1C: CPT

## 2024-12-17 PROCEDURE — 3048F LDL-C <100 MG/DL: CPT | Performed by: INTERNAL MEDICINE

## 2024-12-17 PROCEDURE — 99213 OFFICE O/P EST LOW 20 MIN: CPT | Performed by: INTERNAL MEDICINE

## 2024-12-17 PROCEDURE — 83735 ASSAY OF MAGNESIUM: CPT

## 2024-12-17 PROCEDURE — 3075F SYST BP GE 130 - 139MM HG: CPT

## 2024-12-17 PROCEDURE — 3051F HG A1C>EQUAL 7.0%<8.0%: CPT

## 2024-12-17 PROCEDURE — 3051F HG A1C>EQUAL 7.0%<8.0%: CPT | Performed by: INTERNAL MEDICINE

## 2024-12-17 PROCEDURE — 83880 ASSAY OF NATRIURETIC PEPTIDE: CPT

## 2024-12-17 PROCEDURE — 36415 COLL VENOUS BLD VENIPUNCTURE: CPT

## 2024-12-17 PROCEDURE — 80061 LIPID PANEL: CPT

## 2024-12-17 PROCEDURE — 3008F BODY MASS INDEX DOCD: CPT

## 2024-12-17 NOTE — PROGRESS NOTES
"Subjective   Chief complaint: Candido Roman \"Xiomara" is a 43 y.o. male who presents for No chief complaint on file..    HPI:  Follow-up general care patient did not have his labs done this time he will do it next week.        Objective   There were no vitals taken for this visit.  Physical Exam  Vitals reviewed.   Constitutional:       Appearance: Normal appearance.   HENT:      Head: Normocephalic and atraumatic.   Cardiovascular:      Rate and Rhythm: Normal rate and regular rhythm.   Pulmonary:      Effort: Pulmonary effort is normal.      Breath sounds: Normal breath sounds.   Abdominal:      General: Bowel sounds are normal.      Palpations: Abdomen is soft.   Musculoskeletal:      Cervical back: Neck supple.   Skin:     General: Skin is warm and dry.   Neurological:      General: No focal deficit present.      Mental Status: He is alert.   Psychiatric:         Mood and Affect: Mood normal.         Behavior: Behavior is cooperative.         I have reviewed and reconciled the medication list with the patient today.   Current Outpatient Medications:     diphenhydrAMINE (BENADryl) 50 mg tablet, Take 1 tablet (50 mg) by mouth 1 time if needed for itching (1 hour prior to procedure) for up to 1 dose., Disp: 1 tablet, Rfl: 0    empagliflozin (Jardiance) 25 mg, Take 1 tablet (25 mg) by mouth once daily., Disp: 90 tablet, Rfl: 3    famotidine (Pepcid) 20 mg tablet, Take 1 tablet (20 mg) by mouth 1 time if needed for heartburn (1 hour prior to heart cath) for up to 1 dose., Disp: 1 tablet, Rfl: 0    glimepiride (AmaryL) 1 mg tablet, Take 1 tablet (1 mg) by mouth once daily in the morning. Take before meals., Disp: 100 tablet, Rfl: 3    metoprolol succinate XL (Toprol-XL) 100 mg 24 hr tablet, Take 1 tablet (100 mg) by mouth once daily. Do not crush or chew., Disp: 90 tablet, Rfl: 3    predniSONE (Deltasone) 50 mg tablet, 1 tabs 13 hrs prior to procedure then 1 tab 7 hours prior to procedure and 1 tab 1 hr prior to " procedure, Disp: 3 tablet, Rfl: 0    sacubitriL-valsartan (Entresto) 49-51 mg tablet, Take 1 tablet by mouth 2 times a day., Disp: 180 tablet, Rfl: 3    spironolactone (Aldactone) 25 mg tablet, Take 1 tablet (25 mg) by mouth once daily., Disp: 30 tablet, Rfl: 11    torsemide (Demadex) 20 mg tablet, Take 1 tablet (20 mg) by mouth once daily., Disp: 90 tablet, Rfl: 3     Imaging:  ECG 12 Lead    Result Date: 12/6/2024  Sinus tachycardia, heart rate 111 bpm.    ECG 12 lead (Clinic Performed)    Result Date: 11/19/2024  Sinus rhythm LVH Early repolarization       Labs reviewed:    Lab Results   Component Value Date    WBC 7.8 10/22/2024    HGB 15.2 10/22/2024    HCT 49.8 10/22/2024     10/22/2024    CHOL 123 10/22/2024    TRIG 47 10/22/2024    HDL 36.7 10/22/2024    ALT 11 10/22/2024    AST 15 10/22/2024     10/28/2024    K 3.3 (L) 10/28/2024    CL 97 (L) 10/28/2024    CREATININE 1.19 10/28/2024    BUN 17 10/28/2024    CO2 29 10/28/2024    TSH 1.33 10/22/2024    HGBA1C 7.8 (H) 10/22/2024       Assessment/Plan   Problem List Items Addressed This Visit       Benign essential hypertension - Primary     Current /104. Denies headache, blurry vision, shortness or breath, chest pain. Patient reports medication adherence.   Advised on the importance of home BP monitoring. Reinforced lifestyle modifications including physical activity, dietary efforts, and low sodium diet. Follow-up blood pressure check at next visit.    Increase metoprolol to 100mg         Congestive heart failure       Continue current medications as listed  Follow up in in a few weeks

## 2024-12-17 NOTE — PROGRESS NOTES
"Chief Complaint:   Follow-up     History Of Present Illness:    Kai Roman is a 43 y.o. male presenting with PMH of HFrEF , Nuclear Stress test with moderate-sized reversible perfusion defect in the inferior and inferoseptal walls which improves on prone imaging suggesting soft tissue attenuation although can not completely rule out ischemia, 30 pack smoking history, Bipolar 1 disorder, Type 2 Diabetes, Cerebral artery occlusion with cerebral infarction , GERD (gastroesophageal reflux disease), Hypertension, Myoclonic jerking, and Post traumatic stress disorder (PTSD).     He is here today with his partner. He states that overall he has been feeling fairly well. He was recently evaluated by Cardiology near his home and LHC was ordered. Mr. Roman has not obtained lab work since GDMT was started. He was advised the importance of doing so.  Patient denies chest pain and angina.  Pt denies shortness of breath, dyspnea on exertion, orthopnea, and paroxysmal nocturnal dyspnea.  Pt denies worsening lower extremity edema.  Pt denies palpitations or syncope.  No recent falls.  No fever or chills.  No cough.  No change in bowel or bladder habits.  No sick contacts.  No recent travel.    Review of Systems   All other systems reviewed and are negative.    Last Recorded Vitals:  Vitals:    12/17/24 1317   BP: (!) 131/106   Pulse: 87   SpO2: 98%   Weight: 75.3 kg (166 lb)   Height: 1.651 m (5' 5\")       Past Medical History:  He has a past medical history of Unspecified injury of neck, initial encounter.    Past Surgical History:  He has a past surgical history that includes Hand surgery (10/08/2014).      Social History:  He reports that he has been smoking cigarettes. He has never used smokeless tobacco. He reports that he does not currently use alcohol. He reports current drug use. Drug: Marijuana.    Family History:  No family history on file.     Allergies:  Iodine, Penicillins, Acetaminophen, Bee venom protein (honey " bee), Hydrocodone bitartrate, Hydrocodone-acetaminophen, Iodides, Iodinated contrast media, and Nutritional supplements    Outpatient Medications:  Current Outpatient Medications   Medication Instructions    diphenhydrAMINE (BENADRYL) 50 mg, oral, Once as needed    famotidine (PEPCID) 20 mg, oral, Once as needed    glimepiride (AMARYL) 1 mg, oral, Daily before breakfast    Jardiance 25 mg, oral, Daily    metoprolol succinate XL (TOPROL-XL) 100 mg, oral, Daily, Do not crush or chew.    predniSONE (Deltasone) 50 mg tablet 1 tabs 13 hrs prior to procedure then 1 tab 7 hours prior to procedure and 1 tab 1 hr prior to procedure    sacubitriL-valsartan (Entresto) 49-51 mg tablet 1 tablet, oral, 2 times daily    spironolactone (ALDACTONE) 25 mg, oral, Daily    torsemide (DEMADEX) 20 mg, oral, Daily       Physical Exam  Constitutional:       Appearance: Normal appearance.   Neck:      Vascular: No carotid bruit.   Cardiovascular:      Rate and Rhythm: Normal rate and regular rhythm.      Pulses: Normal pulses.      Heart sounds: Normal heart sounds.      No gallop.   Pulmonary:      Effort: Pulmonary effort is normal.      Breath sounds: Normal breath sounds.   Abdominal:      Palpations: Abdomen is soft.   Musculoskeletal:      Cervical back: Neck supple.   Skin:     General: Skin is warm.      Capillary Refill: Capillary refill takes less than 2 seconds.   Neurological:      General: No focal deficit present.      Mental Status: He is alert and oriented to person, place, and time.   Psychiatric:         Mood and Affect: Mood normal.            Last Labs:  CBC -  Lab Results   Component Value Date    WBC 7.8 10/22/2024    HGB 15.2 10/22/2024    HCT 49.8 10/22/2024    MCV 82 10/22/2024     10/22/2024       CMP -  Lab Results   Component Value Date    CALCIUM 8.5 (L) 10/28/2024    PROT 6.4 10/22/2024    ALBUMIN 3.4 10/22/2024    AST 15 10/22/2024    ALT 11 10/22/2024    ALKPHOS 154 (H) 10/22/2024    BILITOT 1.3 (H)  10/22/2024       LIPID PANEL -   Lab Results   Component Value Date    CHOL 123 10/22/2024    TRIG 47 10/22/2024    HDL 36.7 10/22/2024    CHHDL 3.4 10/22/2024    LDLF 80 11/06/2020    VLDL 9 10/22/2024    NHDL 86 10/22/2024       RENAL FUNCTION PANEL -   Lab Results   Component Value Date    GLUCOSE 193 (H) 10/28/2024     10/28/2024    K 3.3 (L) 10/28/2024    CL 97 (L) 10/28/2024    CO2 29 10/28/2024    ANIONGAP 16 10/28/2024    BUN 17 10/28/2024    CREATININE 1.19 10/28/2024    CALCIUM 8.5 (L) 10/28/2024    ALBUMIN 3.4 10/22/2024        Lab Results   Component Value Date    BNP 1,707 (H) 10/28/2024    HGBA1C 7.8 (H) 10/22/2024     Last Cardiology Tests:  ECG:  ECG 12 lead (Clinic Performed) 10/28/2024    Echo:  Transthoracic Echo Complete 11/06/2024  CONCLUSIONS:   1. The left ventricular systolic function is moderately decreased, with a Head's biplane calculated ejection fraction of 38%.   2. There is global hypokinesis of the left ventricle with minor regional variations.   3. Spectral Doppler shows a Grade II (pseudonormal pattern) of left ventricular diastolic filling.   4. Left ventricular cavity size is mildly dilated.   5. There is reduced right ventricular systolic function.   6. Moderately enlarged right ventricle.   7. The left atrium is moderately dilated.   8. Moderate to severe tricuspid regurgitation.   9. Moderately elevated right ventricular systolic pressure.  10. Aortic valve stenosis is not present.  Ejection Fractions:        EF   Date/Time Value Ref Range Status   11/06/2024 11:57 AM 38 %        Stress Test:  Nuclear Stress Test 11/07/2024    FINDINGS:  There is a moderate-sized reversible perfusion defect in the inferior  and inferoseptal walls which improves on prone imaging suggesting  soft tissue attenuation although can not completely rule out ischemia  given significant LV dysfunction.      Calculated ejection fraction of 16% with global hypokinesis.      IMPRESSION:  1. There  is a moderate-sized reversible perfusion defect in the  inferior and inferoseptal walls which improves on prone imaging  suggesting soft tissue attenuation although can not completely rule  out ischemia given significant LV dysfunction.      2. Calculated ejection fraction of 16% with global hypokinesis.      Assessment/Plan   isa Roman is a 43 y.o. male presenting with PMH of HFrEF , Nuclear Stress test with moderate-sized reversible perfusion defect in the inferior and inferoseptal walls which improves on prone imaging suggesting soft tissue attenuation although can not completely rule out ischemia, 30 pack smoking history, Bipolar 1 disorder, Type 2 Diabetes, Cerebral artery occlusion with cerebral infarction , GERD (gastroesophageal reflux disease), Hypertension, Myoclonic jerking, and Post traumatic stress disorder (PTSD).     He is here today with his partner. He states that overall he has been feeling fairly well. He was recently evaluated by Cardiology near his home and LHC was ordered. Mr. Roman has not obtained lab work since GDMT was started. He was advised the importance of doing so.  Today he appears euvolemic. He is hypertensive despite taking medication. However, recently smoked a cigarette prior to visit.     # NYHA Class 1 AHA Stage B/ Systolic Heart Failure : Will not up titrate medications since has not had lab work.    10/28/2024 Cr 1.19, BNP 1707 ( no labs done since October)   GDMT :  Continue Jardiance 25 mg daily, Metoprolol Succinate 100 mg daily  Continue Torsemide 20 mg daily, Spironolactone 25 mg daily   - LHC scheduled, cMRI scheduled  - Smoking cessation  - lab work  - BNP, BMP, Mg, CBC  - Follow up with Dr. Ron in 1 month or sooner if cardiac symptoms worsen.  - Follow up with PCP     HAILY Anguiano-CNP

## 2024-12-17 NOTE — PATIENT INSTRUCTIONS
Candido    [facility] [phone]    Schedule FUV with Dr. Ron    Lab work today    Brenda JOHANSEN-VIVIANA

## 2024-12-18 ENCOUNTER — TELEPHONE (OUTPATIENT)
Dept: CARDIOLOGY | Facility: HOSPITAL | Age: 44
End: 2024-12-18
Payer: COMMERCIAL

## 2024-12-18 DIAGNOSIS — I50.22 CHRONIC SYSTOLIC HEART FAILURE: ICD-10-CM

## 2024-12-18 DIAGNOSIS — Z86.73 HISTORY OF CVA (CEREBROVASCULAR ACCIDENT): ICD-10-CM

## 2024-12-18 DIAGNOSIS — E11.9 DIABETES MELLITUS TYPE II, NON INSULIN DEPENDENT (MULTI): ICD-10-CM

## 2024-12-18 DIAGNOSIS — R94.39 ABNORMAL STRESS TEST: ICD-10-CM

## 2024-12-18 DIAGNOSIS — Z72.0 TOBACCO USE: ICD-10-CM

## 2024-12-18 DIAGNOSIS — I50.43 ACUTE ON CHRONIC COMBINED SYSTOLIC AND DIASTOLIC CONGESTIVE HEART FAILURE: Primary | ICD-10-CM

## 2024-12-18 DIAGNOSIS — I10 BENIGN ESSENTIAL HYPERTENSION: ICD-10-CM

## 2024-12-18 DIAGNOSIS — I50.20 HFREF (HEART FAILURE WITH REDUCED EJECTION FRACTION): ICD-10-CM

## 2024-12-18 DIAGNOSIS — E78.5 HYPERLIPIDEMIA, UNSPECIFIED HYPERLIPIDEMIA TYPE: ICD-10-CM

## 2024-12-18 LAB
ALBUMIN SERPL BCP-MCNC: 4.1 G/DL (ref 3.4–5)
ALP SERPL-CCNC: 126 U/L (ref 33–120)
ALT SERPL W P-5'-P-CCNC: 12 U/L (ref 10–52)
ANION GAP SERPL CALC-SCNC: 15 MMOL/L (ref 10–20)
AST SERPL W P-5'-P-CCNC: 19 U/L (ref 9–39)
BILIRUB SERPL-MCNC: 0.9 MG/DL (ref 0–1.2)
BNP SERPL-MCNC: 2923 PG/ML (ref 0–99)
BUN SERPL-MCNC: 12 MG/DL (ref 6–23)
CALCIUM SERPL-MCNC: 10 MG/DL (ref 8.6–10.6)
CHLORIDE SERPL-SCNC: 89 MMOL/L (ref 98–107)
CHOLEST SERPL-MCNC: 251 MG/DL (ref 0–199)
CHOLESTEROL/HDL RATIO: 4
CO2 SERPL-SCNC: 38 MMOL/L (ref 21–32)
CREAT SERPL-MCNC: 1.34 MG/DL (ref 0.5–1.3)
EGFRCR SERPLBLD CKD-EPI 2021: 68 ML/MIN/1.73M*2
ERYTHROCYTE [DISTWIDTH] IN BLOOD BY AUTOMATED COUNT: 20 % (ref 11.5–14.5)
EST. AVERAGE GLUCOSE BLD GHB EST-MCNC: 180 MG/DL
GLUCOSE SERPL-MCNC: 214 MG/DL (ref 74–99)
HBA1C MFR BLD: 7.9 %
HCT VFR BLD AUTO: 66 % (ref 41–52)
HDLC SERPL-MCNC: 63.2 MG/DL
HGB BLD-MCNC: 19.9 G/DL (ref 13.5–17.5)
LDLC SERPL CALC-MCNC: 159 MG/DL
MAGNESIUM SERPL-MCNC: 2.46 MG/DL (ref 1.6–2.4)
MCH RBC QN AUTO: 25.5 PG (ref 26–34)
MCHC RBC AUTO-ENTMCNC: 30.2 G/DL (ref 32–36)
MCV RBC AUTO: 85 FL (ref 80–100)
NON HDL CHOLESTEROL: 188 MG/DL (ref 0–149)
NRBC BLD-RTO: 0 /100 WBCS (ref 0–0)
PLATELET # BLD AUTO: 372 X10*3/UL (ref 150–450)
POTASSIUM SERPL-SCNC: 3.6 MMOL/L (ref 3.5–5.3)
PROT SERPL-MCNC: 7.8 G/DL (ref 6.4–8.2)
RBC # BLD AUTO: 7.81 X10*6/UL (ref 4.5–5.9)
SODIUM SERPL-SCNC: 138 MMOL/L (ref 136–145)
TRIGL SERPL-MCNC: 143 MG/DL (ref 0–149)
VLDL: 29 MG/DL (ref 0–40)
WBC # BLD AUTO: 11.2 X10*3/UL (ref 4.4–11.3)

## 2024-12-18 RX ORDER — TORSEMIDE 20 MG/1
20 TABLET ORAL 2 TIMES DAILY
Qty: 180 TABLET | Refills: 1 | Status: SHIPPED | OUTPATIENT
Start: 2024-12-18 | End: 2025-12-18

## 2024-12-18 RX ORDER — ROSUVASTATIN CALCIUM 40 MG/1
40 TABLET, COATED ORAL DAILY
Qty: 90 TABLET | Refills: 3 | Status: SHIPPED | OUTPATIENT
Start: 2024-12-18 | End: 2025-12-18

## 2024-12-18 NOTE — TELEPHONE ENCOUNTER
----- Message from Darvin Ron sent at 12/18/2024  8:14 AM EST -----  Please call the patient regarding his abnormal result.  CMP shows normal serum sodium and potassium with a serum creatinine of 1.34 which is mildly increased from prior lab draw.  BNP significantly elevated at 2923.  Lipid panel shows poor control of cholesterol with an LDL cholesterol of 159 and triglycerides of 143.  Hemoglobin A1c was elevated at 7.9%.  CBC shows elevated hemoglobin of 19.9.  Please make sure patient is taking medications as directed.  Have patient increase torsemide to 20 mg twice daily.  Patient needs to follow low-sodium heart healthy diet.  Please also start patient on rosuvastatin 40 mg daily for LDL reduction.  Repeat BMP/BNP/magnesium in 2 weeks.  Patient should have a follow-up appointment.

## 2024-12-18 NOTE — TELEPHONE ENCOUNTER
12/18/24  1207  Patient returned call.    Gave lab results to patient and plan for starting Crestor 40 mg daily, increasing torsemide to 20 mg BID, following a heart healthy low sodium and gave examples, and having follow up with Dr. Ron.     Patient verbalized understanding of results and was agreeable to plan.     New Rx for Crestor 40 mg daily, Torsemide 20 mg BID sent for approval.    Labs ordered for 2 weeks from today.    Sent patient to [facility] for scheduling follow up appt with Dr. Ron.  12/18/24  1140  Called patient; no answer. Left voice message for patient to return call for results and directives from Dr. Ron    ----- Message from Darvin Ron sent at 12/18/2024  8:14 AM EST -----  Please call the patient regarding his abnormal result.  CMP shows normal serum sodium and potassium with a serum creatinine of 1.34 which is mildly increased from prior lab draw.  BNP significantly elevated at 2923.  Lipid panel shows poor control of cholesterol with an LDL cholesterol of 159 and triglycerides of 143.  Hemoglobin A1c was elevated at 7.9%.  CBC shows elevated hemoglobin of 19.9.  Please make sure patient is taking medications as directed.  Have patient increase torsemide to 20 mg twice daily.  Patient needs to follow low-sodium heart healthy diet.  Please also start patient on rosuvastatin 40 mg daily for LDL reduction.  Repeat BMP/BNP/magnesium in 2 weeks.  Patient should have a follow-up appointment.

## 2024-12-20 ENCOUNTER — TELEMEDICINE (OUTPATIENT)
Dept: PRIMARY CARE | Facility: CLINIC | Age: 44
End: 2024-12-20
Payer: COMMERCIAL

## 2024-12-20 ENCOUNTER — HOSPITAL ENCOUNTER (OUTPATIENT)
Dept: RADIOLOGY | Facility: CLINIC | Age: 44
End: 2024-12-20
Payer: COMMERCIAL

## 2024-12-20 ENCOUNTER — TELEPHONE (OUTPATIENT)
Dept: PRIMARY CARE | Facility: CLINIC | Age: 44
End: 2024-12-20

## 2024-12-20 DIAGNOSIS — E78.5 HYPERLIPIDEMIA, UNSPECIFIED HYPERLIPIDEMIA TYPE: Primary | ICD-10-CM

## 2024-12-20 DIAGNOSIS — E11.9 DIABETES MELLITUS TYPE II, NON INSULIN DEPENDENT (MULTI): ICD-10-CM

## 2024-12-20 DIAGNOSIS — I10 BENIGN ESSENTIAL HYPERTENSION: ICD-10-CM

## 2024-12-20 DIAGNOSIS — Z72.0 TOBACCO USE: ICD-10-CM

## 2024-12-20 DIAGNOSIS — I50.22 CHRONIC SYSTOLIC HEART FAILURE: ICD-10-CM

## 2024-12-20 DIAGNOSIS — R94.39 ABNORMAL STRESS TEST: ICD-10-CM

## 2024-12-20 DIAGNOSIS — I50.20 HFREF (HEART FAILURE WITH REDUCED EJECTION FRACTION): ICD-10-CM

## 2024-12-20 DIAGNOSIS — E11.9 DIABETES MELLITUS TYPE II, NON INSULIN DEPENDENT (MULTI): Primary | ICD-10-CM

## 2024-12-20 DIAGNOSIS — Z86.73 HISTORY OF CVA (CEREBROVASCULAR ACCIDENT): ICD-10-CM

## 2024-12-20 PROBLEM — R10.9 ABDOMINAL PAIN: Status: RESOLVED | Noted: 2024-10-14 | Resolved: 2024-12-20

## 2024-12-20 PROCEDURE — 99214 OFFICE O/P EST MOD 30 MIN: CPT | Performed by: INTERNAL MEDICINE

## 2024-12-20 PROCEDURE — 3051F HG A1C>EQUAL 7.0%<8.0%: CPT | Performed by: INTERNAL MEDICINE

## 2024-12-20 PROCEDURE — 3050F LDL-C >= 130 MG/DL: CPT | Performed by: INTERNAL MEDICINE

## 2024-12-20 NOTE — PROGRESS NOTES
"Subjective   Chief complaint: Candido Roman \"Xiomara" is a 44 y.o. male who presents for Follow-up (Pt being seen for blood work follow up ).    HPI:  Pt presents for virtual visit today to discuss bloodwork results s/p annual wellness visit.    Does not endorse any concerns at this time.         Objective   There were no vitals taken for this visit.  Physical Exam  Constitutional:       General: He is not in acute distress.     Appearance: Normal appearance.   HENT:      Head: Normocephalic and atraumatic.   Neurological:      Mental Status: He is alert.   Psychiatric:         Mood and Affect: Mood normal.         I have reviewed and reconciled the medication list with the patient today.   Current Outpatient Medications:     diphenhydrAMINE (BENADryl) 50 mg tablet, Take 1 tablet (50 mg) by mouth 1 time if needed for itching (1 hour prior to procedure) for up to 1 dose., Disp: 1 tablet, Rfl: 0    empagliflozin (Jardiance) 25 mg, Take 1 tablet (25 mg) by mouth once daily., Disp: 90 tablet, Rfl: 3    famotidine (Pepcid) 20 mg tablet, Take 1 tablet (20 mg) by mouth 1 time if needed for heartburn (1 hour prior to heart cath) for up to 1 dose., Disp: 1 tablet, Rfl: 0    glimepiride (AmaryL) 1 mg tablet, Take 1 tablet (1 mg) by mouth once daily in the morning. Take before meals., Disp: 100 tablet, Rfl: 3    metoprolol succinate XL (Toprol-XL) 100 mg 24 hr tablet, Take 1 tablet (100 mg) by mouth once daily. Do not crush or chew., Disp: 90 tablet, Rfl: 3    predniSONE (Deltasone) 50 mg tablet, 1 tabs 13 hrs prior to procedure then 1 tab 7 hours prior to procedure and 1 tab 1 hr prior to procedure, Disp: 3 tablet, Rfl: 0    rosuvastatin (Crestor) 40 mg tablet, Take 1 tablet (40 mg) by mouth once daily., Disp: 90 tablet, Rfl: 3    sacubitriL-valsartan (Entresto) 49-51 mg tablet, Take 1 tablet by mouth 2 times a day., Disp: 180 tablet, Rfl: 3    spironolactone (Aldactone) 25 mg tablet, Take 1 tablet (25 mg) by mouth once " daily., Disp: 30 tablet, Rfl: 11    torsemide (Demadex) 20 mg tablet, Take 1 tablet (20 mg) by mouth 2 times a day., Disp: 180 tablet, Rfl: 1     Imaging:  ECG 12 Lead    Result Date: 12/6/2024  Sinus tachycardia, heart rate 111 bpm.       Labs reviewed:    Lab Results   Component Value Date    WBC 11.2 12/17/2024    HGB 19.9 (H) 12/17/2024    HCT 66.0 (H) 12/17/2024     12/17/2024    CHOL 251 (H) 12/17/2024    TRIG 143 12/17/2024    HDL 63.2 12/17/2024    ALT 12 12/17/2024    AST 19 12/17/2024     12/17/2024    K 3.6 12/17/2024    CL 89 (L) 12/17/2024    CREATININE 1.34 (H) 12/17/2024    BUN 12 12/17/2024    CO2 38 (H) 12/17/2024    TSH 1.33 10/22/2024    HGBA1C 7.9 (H) 12/17/2024       Assessment/Plan   Problem List Items Addressed This Visit       Benign essential hypertension     Continue metoprolol XL 100mg         Diabetes mellitus type II, non insulin dependent (Multi)     A1C 7.9     Increase glimepiride to 1mg BID          HFrEF (heart failure with reduced ejection fraction)     Decrease spironolactone to 1/2 pill daily due to kidney function         Abnormal stress test    History of CVA (cerebrovascular accident)    Tobacco use    Hyperlipidemia - Primary     Labs not at goal     Increase crestor to 80mg every other day     Example:   Monday - 40mg   Tuesday - 80mg  Wednesday - 40mg  Thursday - 80mg  ....             Other Visit Diagnoses       Chronic systolic heart failure                Continue current medications as listed  Follow up at end of January for lab work.

## 2024-12-20 NOTE — ASSESSMENT & PLAN NOTE
Labs not at goal     Increase crestor to 80mg every other day     Example:   Monday - 40mg   Tuesday - 80mg  Wednesday - 40mg  Thursday - 80mg  ....

## 2024-12-21 RX ORDER — METOPROLOL SUCCINATE 100 MG/1
100 TABLET, EXTENDED RELEASE ORAL DAILY
Qty: 90 TABLET | Refills: 3 | Status: SHIPPED | OUTPATIENT
Start: 2024-12-21 | End: 2025-12-21

## 2024-12-27 ENCOUNTER — HOSPITAL ENCOUNTER (OUTPATIENT)
Facility: HOSPITAL | Age: 44
Setting detail: OUTPATIENT SURGERY
Discharge: HOME | End: 2024-12-27
Attending: STUDENT IN AN ORGANIZED HEALTH CARE EDUCATION/TRAINING PROGRAM | Admitting: STUDENT IN AN ORGANIZED HEALTH CARE EDUCATION/TRAINING PROGRAM
Payer: COMMERCIAL

## 2024-12-27 VITALS
HEART RATE: 85 BPM | TEMPERATURE: 98.4 F | RESPIRATION RATE: 17 BRPM | BODY MASS INDEX: 29.95 KG/M2 | DIASTOLIC BLOOD PRESSURE: 89 MMHG | SYSTOLIC BLOOD PRESSURE: 138 MMHG | OXYGEN SATURATION: 92 % | WEIGHT: 180 LBS

## 2024-12-27 DIAGNOSIS — I50.20 HFREF (HEART FAILURE WITH REDUCED EJECTION FRACTION): Primary | ICD-10-CM

## 2024-12-27 DIAGNOSIS — R94.39 ABNORMAL STRESS TEST: ICD-10-CM

## 2024-12-27 DIAGNOSIS — I42.9 CARDIOMYOPATHY, UNSPECIFIED: ICD-10-CM

## 2024-12-27 DIAGNOSIS — R94.30 ABNORMAL RESULT OF CARDIOVASCULAR FUNCTION STUDY, UNSPECIFIED: ICD-10-CM

## 2024-12-27 LAB
BASE EXCESS BLDMV CALC-SCNC: 5.1 MMOL/L (ref -2–3)
BODY TEMPERATURE: 37 DEGREES CELSIUS
GLUCOSE BLD MANUAL STRIP-MCNC: 311 MG/DL (ref 74–99)
HCO3 BLDMV-SCNC: 30.5 MMOL/L (ref 22–26)
OXYHGB MFR BLDMV: 74 % (ref 45–75)
PCO2 BLDMV: 47 MM HG (ref 41–51)
PH BLDMV: 7.42 PH (ref 7.33–7.43)
PO2 BLDMV: 46 MM HG (ref 35–45)
SAO2 % BLDMV: 77 % (ref 45–75)

## 2024-12-27 PROCEDURE — 93460 R&L HRT ART/VENTRICLE ANGIO: CPT | Performed by: STUDENT IN AN ORGANIZED HEALTH CARE EDUCATION/TRAINING PROGRAM

## 2024-12-27 PROCEDURE — 2500000004 HC RX 250 GENERAL PHARMACY W/ HCPCS (ALT 636 FOR OP/ED): Performed by: STUDENT IN AN ORGANIZED HEALTH CARE EDUCATION/TRAINING PROGRAM

## 2024-12-27 PROCEDURE — 2500000005 HC RX 250 GENERAL PHARMACY W/O HCPCS: Performed by: STUDENT IN AN ORGANIZED HEALTH CARE EDUCATION/TRAINING PROGRAM

## 2024-12-27 PROCEDURE — C1887 CATHETER, GUIDING: HCPCS | Performed by: STUDENT IN AN ORGANIZED HEALTH CARE EDUCATION/TRAINING PROGRAM

## 2024-12-27 PROCEDURE — C1760 CLOSURE DEV, VASC: HCPCS | Performed by: STUDENT IN AN ORGANIZED HEALTH CARE EDUCATION/TRAINING PROGRAM

## 2024-12-27 PROCEDURE — 2550000001 HC RX 255 CONTRASTS: Performed by: STUDENT IN AN ORGANIZED HEALTH CARE EDUCATION/TRAINING PROGRAM

## 2024-12-27 PROCEDURE — 82947 ASSAY GLUCOSE BLOOD QUANT: CPT

## 2024-12-27 PROCEDURE — 7100000009 HC PHASE TWO TIME - INITIAL BASE CHARGE: Performed by: STUDENT IN AN ORGANIZED HEALTH CARE EDUCATION/TRAINING PROGRAM

## 2024-12-27 PROCEDURE — 2500000004 HC RX 250 GENERAL PHARMACY W/ HCPCS (ALT 636 FOR OP/ED): Performed by: NURSE PRACTITIONER

## 2024-12-27 PROCEDURE — 7100000010 HC PHASE TWO TIME - EACH INCREMENTAL 1 MINUTE: Performed by: STUDENT IN AN ORGANIZED HEALTH CARE EDUCATION/TRAINING PROGRAM

## 2024-12-27 PROCEDURE — 82810 BLOOD GASES O2 SAT ONLY: CPT | Mod: CCI

## 2024-12-27 PROCEDURE — 99152 MOD SED SAME PHYS/QHP 5/>YRS: CPT | Performed by: STUDENT IN AN ORGANIZED HEALTH CARE EDUCATION/TRAINING PROGRAM

## 2024-12-27 PROCEDURE — 96373 THER/PROPH/DIAG INJ IA: CPT | Mod: 59 | Performed by: STUDENT IN AN ORGANIZED HEALTH CARE EDUCATION/TRAINING PROGRAM

## 2024-12-27 PROCEDURE — C1769 GUIDE WIRE: HCPCS | Performed by: STUDENT IN AN ORGANIZED HEALTH CARE EDUCATION/TRAINING PROGRAM

## 2024-12-27 PROCEDURE — 93456 R HRT CORONARY ARTERY ANGIO: CPT | Performed by: STUDENT IN AN ORGANIZED HEALTH CARE EDUCATION/TRAINING PROGRAM

## 2024-12-27 PROCEDURE — 2720000007 HC OR 272 NO HCPCS: Performed by: STUDENT IN AN ORGANIZED HEALTH CARE EDUCATION/TRAINING PROGRAM

## 2024-12-27 PROCEDURE — C1894 INTRO/SHEATH, NON-LASER: HCPCS | Performed by: STUDENT IN AN ORGANIZED HEALTH CARE EDUCATION/TRAINING PROGRAM

## 2024-12-27 RX ORDER — FAMOTIDINE 10 MG/ML
20 INJECTION INTRAVENOUS ONCE
Status: DISCONTINUED | OUTPATIENT
Start: 2024-12-27 | End: 2024-12-27 | Stop reason: HOSPADM

## 2024-12-27 RX ORDER — SODIUM CHLORIDE 9 MG/ML
100 INJECTION, SOLUTION INTRAVENOUS CONTINUOUS
Status: DISCONTINUED | OUTPATIENT
Start: 2024-12-27 | End: 2024-12-27 | Stop reason: HOSPADM

## 2024-12-27 RX ORDER — LIDOCAINE HYDROCHLORIDE 20 MG/ML
INJECTION, SOLUTION INFILTRATION; PERINEURAL AS NEEDED
Status: DISCONTINUED | OUTPATIENT
Start: 2024-12-27 | End: 2024-12-27 | Stop reason: HOSPADM

## 2024-12-27 RX ORDER — DIPHENHYDRAMINE HYDROCHLORIDE 50 MG/ML
50 INJECTION INTRAMUSCULAR; INTRAVENOUS ONCE
Status: DISCONTINUED | OUTPATIENT
Start: 2024-12-27 | End: 2024-12-27 | Stop reason: HOSPADM

## 2024-12-27 RX ORDER — MIDAZOLAM HYDROCHLORIDE 1 MG/ML
INJECTION, SOLUTION INTRAMUSCULAR; INTRAVENOUS AS NEEDED
Status: DISCONTINUED | OUTPATIENT
Start: 2024-12-27 | End: 2024-12-27 | Stop reason: HOSPADM

## 2024-12-27 RX ORDER — FENTANYL CITRATE 50 UG/ML
INJECTION, SOLUTION INTRAMUSCULAR; INTRAVENOUS AS NEEDED
Status: DISCONTINUED | OUTPATIENT
Start: 2024-12-27 | End: 2024-12-27 | Stop reason: HOSPADM

## 2024-12-27 RX ORDER — HEPARIN SODIUM 1000 [USP'U]/ML
INJECTION, SOLUTION INTRAVENOUS; SUBCUTANEOUS AS NEEDED
Status: DISCONTINUED | OUTPATIENT
Start: 2024-12-27 | End: 2024-12-27 | Stop reason: HOSPADM

## 2024-12-27 RX ADMIN — SODIUM CHLORIDE 50 ML/HR: 9 INJECTION, SOLUTION INTRAVENOUS at 07:25

## 2024-12-27 ASSESSMENT — PAIN SCALES - GENERAL
PAINLEVEL_OUTOF10: 0 - NO PAIN

## 2024-12-27 ASSESSMENT — COLUMBIA-SUICIDE SEVERITY RATING SCALE - C-SSRS
1. IN THE PAST MONTH, HAVE YOU WISHED YOU WERE DEAD OR WISHED YOU COULD GO TO SLEEP AND NOT WAKE UP?: NO
6. HAVE YOU EVER DONE ANYTHING, STARTED TO DO ANYTHING, OR PREPARED TO DO ANYTHING TO END YOUR LIFE?: NO
2. HAVE YOU ACTUALLY HAD ANY THOUGHTS OF KILLING YOURSELF?: NO

## 2024-12-27 ASSESSMENT — PAIN - FUNCTIONAL ASSESSMENT: PAIN_FUNCTIONAL_ASSESSMENT: 0-10

## 2024-12-27 NOTE — POST-PROCEDURE NOTE
Physician Transition of Care Summary  Invasive Cardiovascular Lab    Procedure Date: 12/27/2024  Attending:    Jose Tam - Primary  Resident/Fellow/Other Assistant: Surgeons and Role:  * No surgeons found with a matching role *    Indications:   Pre-op Diagnosis      * HFrEF (heart failure with reduced ejection fraction) [I50.20]     * Abnormal stress test [R94.39]    Post-procedure diagnosis:   Post-op Diagnosis     * HFrEF (heart failure with reduced ejection fraction) [I50.20]     * Abnormal stress test [R94.39]    Procedure(s):   Left And Right Heart Cath, With LV  43507 - ME R & L HRT CATH W/NJX L VENTRICULOG IMG S&I        Procedure Findings:   Non obstructive CAD  Normal filling pressure and normal cardiac output/index  Mild pulmonary HTN    Description of the Procedure:   As above    Complications:   None    Stents/Implants:   Implants       No implant documentation for this case.            Anticoagulation/Antiplatelet Plan:   NA    Estimated Blood Loss:   5 mL    Anesthesia: Moderate Sedation Anesthesia Staff: No anesthesia staff entered.    Any Specimen(s) Removed:   Order Name Source Comment Collection Info Order Time   COAGULATION SCREEN Blood, Venous If not done in the last 30 days  12/27/2024  7:20 AM     Release result to [employer]   Immediate        BASIC METABOLIC PANEL Blood, Venous If not done in the last 30 days  12/27/2024  7:20 AM     Release result to [employer]   Immediate        CBC Blood, Venous If not done in the last 30 days  12/27/2024  7:20 AM     Release result to [employer]   Immediate            Disposition:   Home      Electronically signed by: Carmelo Tam MD, 12/27/2024 11:46 AM

## 2024-12-27 NOTE — Clinical Note
Patient ID band present and verified. Patient contact is in waiting room. Contact(s) present: spouse. Contact name: RadhaNae

## 2024-12-27 NOTE — Clinical Note
Sheath was removed in the right radial artery. Site closed by TR-Band. 14 Ml air in the band at 0843

## 2024-12-31 PROBLEM — R15.9 INCONTINENCE OF FECES: Status: ACTIVE | Noted: 2024-12-31

## 2024-12-31 PROBLEM — R32 URINARY INCONTINENCE: Status: ACTIVE | Noted: 2024-12-31

## 2024-12-31 PROBLEM — E66.9 OBESITY: Status: ACTIVE | Noted: 2024-12-31

## 2024-12-31 PROBLEM — B49 INFECTION DUE TO FUNGUS: Status: ACTIVE | Noted: 2024-10-14

## 2025-01-07 ENCOUNTER — OFFICE VISIT (OUTPATIENT)
Dept: CARDIOLOGY | Facility: HOSPITAL | Age: 45
End: 2025-01-07
Payer: COMMERCIAL

## 2025-01-07 VITALS
BODY MASS INDEX: 28.82 KG/M2 | HEART RATE: 93 BPM | SYSTOLIC BLOOD PRESSURE: 118 MMHG | OXYGEN SATURATION: 97 % | HEIGHT: 65 IN | WEIGHT: 173 LBS | DIASTOLIC BLOOD PRESSURE: 76 MMHG

## 2025-01-07 DIAGNOSIS — I21.4 NSTEMI (NON-ST ELEVATED MYOCARDIAL INFARCTION) (MULTI): ICD-10-CM

## 2025-01-07 DIAGNOSIS — I25.10 MILD CAD: ICD-10-CM

## 2025-01-07 DIAGNOSIS — I50.20 HFREF (HEART FAILURE WITH REDUCED EJECTION FRACTION): ICD-10-CM

## 2025-01-07 DIAGNOSIS — I10 BENIGN ESSENTIAL HYPERTENSION: Primary | ICD-10-CM

## 2025-01-07 PROCEDURE — 99214 OFFICE O/P EST MOD 30 MIN: CPT | Performed by: PHYSICIAN ASSISTANT

## 2025-01-07 PROCEDURE — 3008F BODY MASS INDEX DOCD: CPT | Performed by: PHYSICIAN ASSISTANT

## 2025-01-07 PROCEDURE — 3078F DIAST BP <80 MM HG: CPT | Performed by: PHYSICIAN ASSISTANT

## 2025-01-07 PROCEDURE — 3074F SYST BP LT 130 MM HG: CPT | Performed by: PHYSICIAN ASSISTANT

## 2025-01-07 ASSESSMENT — ENCOUNTER SYMPTOMS
VOMITING: 0
ORTHOPNEA: 0
DIARRHEA: 0
FEVER: 0
WEIGHT LOSS: 1
DYSURIA: 0
NAUSEA: 0
SHORTNESS OF BREATH: 0
IRREGULAR HEARTBEAT: 0
WEAKNESS: 0
WHEEZING: 0
PALPITATIONS: 0
ABDOMINAL PAIN: 0

## 2025-01-07 NOTE — PROGRESS NOTES
Cardiology Follow Up  Chief Complaint:   Follow up of recent cath.     History Of Present Illness:    This is a 43-year-old male here for evaluation regarding HFrEF, abnormal stress, hypertension, diabetes mellitus, history of CVA, and tobacco use.  [facility] but was previously seen by cardiology PA in November 2024 at which time he reported feeling improved after restarting medical therapy.  It was recommended he have a heart catheterization once his medical therapy was maximized, cardiac MRI was ordered, blood work was ordered, and he was asked to follow-up in several days.  BMP done 10/28/2024 showed a serum sodium of 139, serum potassium 3.3, serum creatinine of 1.19, BNP was 1707.  Lipid panel done 10/22/2024 showed an LDL cholesterol 77 and triglycerides of 47 not currently on any lipid-lowering medical therapy.  Hemoglobin A1c done 10/22/2024 was 7.8%, TSH was 1.33, CBC showed a hemoglobin of 15.2.  Nuclear stress done 11/7/2024 showed a moderate size reversible perfusion defect in the inferior/inferoseptal walls which improved on prone imaging suggesting soft tissue attenuation although cannot completely rule out ischemia, calculated ejection fraction of 16%.  Echocardiogram done 11/6/2024 showed moderately decreased left ventricular systolic function with an ejection fraction of 38% and global hypokinesis, grade 2 diastolic dysfunction, reduced right ventricular systolic function, and moderate to severe tricuspid valve regurgitation. ECG done today showed sinus tachycardia with a heart rate of 111 bpm.  The patient reports having shortness of breath on exertion. He denies any associated chest pain, palpitations and lightheadedness. He states that he sometimes misses his cardiac medications for 1 or 2 days, although he states that he took his medications this morning. He states that he realized yesterday that had been taking Entresto once daily  "instead on twice daily. He states that his furosemide had been decreased to 40 mg daily. He states that he takes his other medications as prescribed. During my exam, he was resting comfortably on the exam table.    1-7-25: This is a 44-year-old patient known to Dr. Ron.  He presents today for follow-up after recent cardiac testing.  Patient has had combined systolic and diastolic heart failure.  He been placed on appropriate medical therapy and had heart catheterization because of the LV dysfunction showing mild nonobstructive coronary disease and at the time after treatment wedge pressure was down to 10.  Since having the procedure the patient's weight has been going down if not stable right around 170 pounds and when he started having these issues he was 230 pounds.  Swelling is essentially resolved and he is following a salt and fluid restricted diet.  He is not having shortness of breath orthopnea or PND and blood pressures have been controlled and he is tolerating all these meds.  He has had a slight rising creatinine so we will recheck a BMP in about a week.     Last Recorded Vitals:  Vitals:    01/07/25 1420   BP: 118/76   BP Location: Left arm   Patient Position: Sitting   BP Cuff Size: Adult   Pulse: 93   SpO2: 97%   Weight: 78.5 kg (173 lb)   Height: 1.651 m (5' 5\")       Past Medical History:  He has a past medical history of Diabetes mellitus (Multi), Hypertension, Stroke (Multi), and Unspecified injury of neck, initial encounter.    Past Surgical History:  He has a past surgical history that includes Hand surgery (10/08/2014) and Cardiac catheterization (N/A, 12/27/2024).      Social History:  He reports that he has been smoking cigarettes. He has never used smokeless tobacco. He reports current alcohol use. He reports current drug use. Drug: Marijuana.    Family History:  No family history on file.     Allergies:  Iodine, Penicillins, Acetaminophen, Bee venom protein (honey bee), Hydrocodone " bitartrate, Hydrocodone-acetaminophen, Iodides, Iodinated contrast media, and Nutritional supplements    Outpatient Medications:  Current Outpatient Medications   Medication Instructions    empagliflozin (JARDIANCE) 25 mg, oral, Daily    famotidine (PEPCID) 20 mg, oral, Once as needed    glimepiride (AMARYL) 1 mg, oral, Daily before breakfast    metoprolol succinate XL (TOPROL-XL) 100 mg, oral, Daily, Do not crush or chew.    rosuvastatin (CRESTOR) 40 mg, oral, Daily    sacubitriL-valsartan (Entresto) 49-51 mg tablet 1 tablet, oral, 2 times daily    spironolactone (ALDACTONE) 25 mg, oral, Daily    torsemide (DEMADEX) 20 mg, oral, 2 times daily     Review of Systems   Constitutional: Positive for weight loss. Negative for fever and malaise/fatigue.   Cardiovascular:  Negative for chest pain, irregular heartbeat, orthopnea and palpitations.   Respiratory:  Negative for shortness of breath and wheezing.    Skin:  Negative for itching and rash.   Gastrointestinal:  Negative for abdominal pain, diarrhea, nausea and vomiting.   Genitourinary:  Negative for dysuria.   Neurological:  Negative for weakness.      Physical Exam  Constitutional:       General: He is not in acute distress.     Appearance: Normal appearance.   HENT:      Mouth/Throat:      Mouth: Mucous membranes are moist.   Neck:      Comments: No JVD or HJR  Cardiovascular:      Rate and Rhythm: Normal rate and regular rhythm.      Heart sounds: Normal heart sounds. No murmur heard.     Comments: No gallop  Pulmonary:      Effort: Pulmonary effort is normal.      Breath sounds: Normal breath sounds. No wheezing or rales.   Abdominal:      General: Abdomen is flat. Bowel sounds are normal.      Palpations: Abdomen is soft.   Musculoskeletal:         General: No swelling.   Skin:     General: Skin is warm and dry.   Neurological:      Mental Status: He is alert and oriented to person, place, and time.   Psychiatric:         Mood and Affect: Mood normal.            Last Labs:  CBC -  Lab Results   Component Value Date    WBC 11.2 12/17/2024    HGB 19.9 (H) 12/17/2024    HCT 66.0 (H) 12/17/2024    MCV 85 12/17/2024     12/17/2024       CMP -  Lab Results   Component Value Date    CALCIUM 10.0 12/17/2024    PROT 7.8 12/17/2024    ALBUMIN 4.1 12/17/2024    AST 19 12/17/2024    ALT 12 12/17/2024    ALKPHOS 126 (H) 12/17/2024    BILITOT 0.9 12/17/2024       LIPID PANEL -   Lab Results   Component Value Date    CHOL 251 (H) 12/17/2024    TRIG 143 12/17/2024    HDL 63.2 12/17/2024    CHHDL 4.0 12/17/2024    LDLF 80 11/06/2020    VLDL 29 12/17/2024    NHDL 188 (H) 12/17/2024       RENAL FUNCTION PANEL -   Lab Results   Component Value Date    GLUCOSE 214 (H) 12/17/2024     12/17/2024    K 3.6 12/17/2024    CL 89 (L) 12/17/2024    CO2 38 (H) 12/17/2024    ANIONGAP 15 12/17/2024    BUN 12 12/17/2024    CREATININE 1.34 (H) 12/17/2024    CALCIUM 10.0 12/17/2024    ALBUMIN 4.1 12/17/2024        Lab Results   Component Value Date    BNP 2,923 (H) 12/17/2024    HGBA1C 7.9 (H) 12/17/2024       Last Cardiology Tests:    Echo:  Transthoracic Echo Complete 11/06/2024--CONCLUSIONS:   1. The left ventricular systolic function is moderately decreased, with a Head's biplane calculated ejection fraction of 38%.   2. There is global hypokinesis of the left ventricle with minor regional variations.   3. Spectral Doppler shows a Grade II (pseudonormal pattern) of left ventricular diastolic filling.   4. Left ventricular cavity size is mildly dilated.   5. There is reduced right ventricular systolic function.   6. Moderately enlarged right ventricle.   7. The left atrium is moderately dilated.   8. Moderate to severe tricuspid regurgitation.   9. Moderately elevated right ventricular systolic pressure.  10. Aortic valve stenosis is not present.    Ejection Fractions:  EF   Date/Time Value Ref Range Status   11/06/2024 11:57 AM 38 %      Cath:  Cardiac Catheterization Procedure  12/27/2024--CONCLUSIONS:   1. Non-obstructive coronary artery disease.   2. Normal filling pressure with PCWP of 10 mmHg.   3. Mild pulmonary HTN with mPAP of 29 mmHg (Pre capillary).   4. Preserved cardiac output and index of 4.6 L/min and 2.5 L/min/m2 respectively.  Stress Test:  Nuclear Stress Test 11/07/2024--IMPRESSION:  1. There is a moderate-sized reversible perfusion defect in the  inferior and inferoseptal walls which improves on prone imaging  suggesting soft tissue attenuation although can not completely rule  out ischemia given significant LV dysfunction.      2. Calculated ejection fraction of 16% with global hypokinesis.          Lab review: I have personally reviewed the laboratory result(s).    Assessment/Plan   Problem List Items Addressed This Visit             ICD-10-CM       Cardiac and Vasculature    Benign essential hypertension - Primary I10    Relevant Orders    Basic metabolic panel    NSTEMI (non-ST elevated myocardial infarction) (Multi) I21.4    HFrEF (heart failure with reduced ejection fraction) I50.20    Relevant Orders    Basic metabolic panel    Mild CAD I25.10   Heart failure with reduced ejection fraction--on exam today appears well compensated with flat neck veins and no peripheral edema.  He is not having shortness of breath orthopnea or PND.  He is tolerating all of his medication albeit with a slight rising creatinine.  I will recheck a BMP in 1 week's time.  Again heart catheterization showed mild CAD and wedge pressure at the time after appropriate treatment and current regimen showed a wedge of 10.  He is instructed to stick with a salt and fluid restricted heart healthy diet and maintain weights and if significant increase in weight edema shortness of breath contact the office right away.  Mild nonobstructive CAD--no chest pain or anginal symptoms.  Blood pressures are well-controlled.  Continue current medical therapy  Hypertension--previously blood pressures were well  over 200 but now very well-controlled on current meds.  Again patient is avoiding excess salt and fluid in his diet.  Overall patient doing very well from a cardiac standpoint appears well compensated at this time.  We were able to go over the results of his echo and right and left heart catheterization and the patient is doing a good job with compliance on his meds and will continue to do so and if any significant changes he will contact the office otherwise we will arrange close follow-up with Dr. Ron in 4 months time.      Tree Bates PA-C  1/7/2025  2:35 PM

## 2025-01-07 NOTE — PATIENT INSTRUCTIONS
Weigh yourself daily and record. If you gain 3lbs or more over your baseline weight (or if gain >3lbs in 1 day or >5lbs in 1 week), please call your heart failure/cardiology doctor.     Take an extra torsemide tablet as needed for:  Gain of 3lbs or more over your baseline weight (or weight gain of 3lbs in 1 day or 5lbs in 1 week)  Worsening shortness of breath, especially when you lay down    Worsening leg swelling  - If no improvement with the extra dose, please call your heart failure/cardiology doctor.     Maintain a heart healthy diet with no more than 2G of sodium daily.   Restrict fluid intake to 2 Liters (or 8 cups) per day   We will arrange for OV with Dr. Ron in

## 2025-02-24 ENCOUNTER — HOSPITAL ENCOUNTER (EMERGENCY)
Facility: HOSPITAL | Age: 45
Discharge: HOME | End: 2025-02-24
Payer: COMMERCIAL

## 2025-02-24 VITALS
OXYGEN SATURATION: 99 % | WEIGHT: 160 LBS | RESPIRATION RATE: 16 BRPM | BODY MASS INDEX: 26.66 KG/M2 | SYSTOLIC BLOOD PRESSURE: 136 MMHG | DIASTOLIC BLOOD PRESSURE: 96 MMHG | HEIGHT: 65 IN | HEART RATE: 82 BPM | TEMPERATURE: 96.5 F

## 2025-02-24 DIAGNOSIS — K02.9 DENTAL DECAY: ICD-10-CM

## 2025-02-24 DIAGNOSIS — K04.7 DENTAL ABSCESS: Primary | ICD-10-CM

## 2025-02-24 PROCEDURE — 2500000004 HC RX 250 GENERAL PHARMACY W/ HCPCS (ALT 636 FOR OP/ED): Performed by: NURSE PRACTITIONER

## 2025-02-24 PROCEDURE — 96372 THER/PROPH/DIAG INJ SC/IM: CPT | Performed by: NURSE PRACTITIONER

## 2025-02-24 PROCEDURE — 2500000001 HC RX 250 WO HCPCS SELF ADMINISTERED DRUGS (ALT 637 FOR MEDICARE OP): Performed by: NURSE PRACTITIONER

## 2025-02-24 PROCEDURE — 99284 EMERGENCY DEPT VISIT MOD MDM: CPT

## 2025-02-24 RX ORDER — CLINDAMYCIN HYDROCHLORIDE 150 MG/1
450 CAPSULE ORAL ONCE
Status: COMPLETED | OUTPATIENT
Start: 2025-02-24 | End: 2025-02-24

## 2025-02-24 RX ORDER — OXYCODONE HYDROCHLORIDE 5 MG/1
5 TABLET ORAL ONCE
Status: COMPLETED | OUTPATIENT
Start: 2025-02-24 | End: 2025-02-24

## 2025-02-24 RX ORDER — IBUPROFEN 600 MG/1
600 TABLET ORAL EVERY 6 HOURS PRN
Qty: 20 TABLET | Refills: 0 | Status: SHIPPED | OUTPATIENT
Start: 2025-02-24 | End: 2025-03-01

## 2025-02-24 RX ORDER — CHLORHEXIDINE GLUCONATE ORAL RINSE 1.2 MG/ML
15 SOLUTION DENTAL 2 TIMES DAILY
Qty: 240 ML | Refills: 0 | Status: SHIPPED | OUTPATIENT
Start: 2025-02-24 | End: 2025-03-03

## 2025-02-24 RX ORDER — KETOROLAC TROMETHAMINE 30 MG/ML
30 INJECTION, SOLUTION INTRAMUSCULAR; INTRAVENOUS ONCE
Status: COMPLETED | OUTPATIENT
Start: 2025-02-24 | End: 2025-02-24

## 2025-02-24 RX ORDER — CLINDAMYCIN HYDROCHLORIDE 150 MG/1
450 CAPSULE ORAL 3 TIMES DAILY
Qty: 63 CAPSULE | Refills: 0 | Status: SHIPPED | OUTPATIENT
Start: 2025-02-24 | End: 2025-03-03

## 2025-02-24 RX ADMIN — CLINDAMYCIN HYDROCHLORIDE 450 MG: 150 CAPSULE ORAL at 23:05

## 2025-02-24 RX ADMIN — KETOROLAC TROMETHAMINE 30 MG: 30 INJECTION, SOLUTION INTRAMUSCULAR at 23:04

## 2025-02-24 RX ADMIN — OXYCODONE HYDROCHLORIDE 5 MG: 5 TABLET ORAL at 23:05

## 2025-02-24 ASSESSMENT — PAIN - FUNCTIONAL ASSESSMENT: PAIN_FUNCTIONAL_ASSESSMENT: 0-10

## 2025-02-24 ASSESSMENT — PAIN SCALES - GENERAL: PAINLEVEL_OUTOF10: 8

## 2025-02-25 NOTE — ED PROVIDER NOTES
[facility]  ED  Encounter Note  Admit Date/RoomTime: 2025 10:23 PM  ED Room: [id number]    NAME: Candido Roman  : 1980  MRN: [id number]     Chief Complaint:  Oral Swelling and Dental Pain (Bottom right jaw pain and infection)    History of Present Illness        Candido Roman is a 44 y.o. old male who presents to the emergency department for evaluation of dental pain and swelling to the right bottom teeth and jaw for the last couple of days.  Patient has had bad teeth for some time and this has been a problematic area in the past.  He has not been on any antibiotics recently.  He admits these teeth need to be extracted.  It is hot and cold sensitive as well as causing him swelling to his lower jaw.  He began noticing some pus drainage today causing him to seek evaluation.  There is no associated syncope, chest pain, shortness of breath, vomiting, abdominal pain, numbness or ataxia. He has not measured a fever. He does smoke cigarettes.        Qualifiers:      Following Trauma:   No.     Tooth problematic in the past:   Yes.     Patient has a dentist:   No.     Recent Dental Procedure:   No.     ROS   Pertinent positives and negatives are stated within HPI, all other systems reviewed and are negative.    Past Medical History:  has a past medical history of Diabetes mellitus (Multi), Hypertension, Stroke (Multi), and Unspecified injury of neck, initial encounter.    Surgical History:  has a past surgical history that includes Hand surgery (10/08/2014) and Cardiac catheterization (N/A, 2024).    Social History:  reports that he has been smoking cigarettes. He has never used smokeless tobacco. He reports current alcohol use. He reports current drug use. Drug: Marijuana.    Family History: family history is not on file.     Allergies: Iodine, Penicillins, Acetaminophen, Bee venom protein (honey bee), Hydrocodone bitartrate, Hydrocodone-acetaminophen, Iodides,  "Iodinated contrast media, and Nutritional supplements    Physical Exam   Oxygen Saturation Interpretation: Normal.      Visit Vitals  BP (!) 136/96   Pulse 82   Temp 35.8 °C (96.5 °F)   Resp 16   Ht 1.651 m (5' 5\")   Wt 72.6 kg (160 lb)   SpO2 99%   BMI 26.63 kg/m²   Smoking Status Every Day   BSA 1.82 m²        Constitutional:  Alert, development consistent with age.  HEENT:  NC/NT. Nares normal. External canals clear bilaterally with  normal appearing TM's bilaterally. Airway patent.  Neck:  Supple. Normal ROM.  Lips:  upper and lower normal.  Mouth:  normal tongue and buccal mucosa. Floor of the mouth soft.  Dental: Missing teeth and dental decay throughout the mouth.  The right lower canines and incisors are significantly decayed and in the area of #27 is tenderness and swelling to the gingiva on the buccal aspect with spontaneous purulent drainage on manipulation.       Trismus: No.       Drooling: No.        Airway stridor: No.  Facial skin: right lower facial swelling without erythema or wound. This does not extend under the mandible.  Respiratory:  Clear to auscultation and breath sounds equal.  No respiratory distress or increased work of breathing.  CV: Regular rate and rhythm, no murmur. Strong radial pulse.   Integument:  No rashes, erythema or lesions present, unless noted elsewhere. Normal capillary refill.  Neurological:  Alert and oriented. Motor and sensory functions intact.    Lab / Imaging Results   (All laboratory and radiology results have been personally reviewed by myself)  Labs:  Results for orders placed or performed during the hospital encounter of 12/27/24   Blood Gas Mixed Venous Unsolicited    Collection Time: 12/27/24  8:23 AM   Result Value Ref Range    POCT pH, Mixed 7.42 7.33 - 7.43 pH    POCT pCO2, Mixed 47 41 - 51 mm Hg    POCT pO2, Mixed 46 (H) 35 - 45 mm Hg    POCT SO2, Mixed 77 (H) 45 - 75 %    POCT Oxy Hemoglobin, Mixed 74.0 45.0 - 75.0 %    POCT Base Excess, Mixed 5.1 (H) -2.0 " - 3.0 mmol/L    POCT HCO3 Calculated, Mixed 30.5 (H) 22.0 - 26.0 mmol/L    Patient Temperature 37.0 degrees Celsius   POCT GLUCOSE    Collection Time: 12/27/24 10:53 AM   Result Value Ref Range    POCT Glucose 311 (H) 74 - 99 mg/dL     Imaging:  All Radiology results interpreted by Radiologist unless otherwise noted.  No orders to display     ED Course / Medical Decision Making     Medications   ketorolac (Toradol) injection 30 mg (has no administration in time range)   clindamycin (Cleocin) capsule 450 mg (has no administration in time range)   oxyCODONE (Roxicodone) immediate release tablet 5 mg (has no administration in time range)     Diagnoses as of 02/24/25 8843   Dental abscess   Dental decay     Consult(s):   Dental Resident was not consulted to see patient regarding complaint.    Procedure(s):   None    MDM:  Emergent dental referral was not required at the time of exam. Plan is for medications as listed below and outpatient follow up with dentistry.  Patient is nontoxic, without airway compromise and appropriate for this outpatient management plan. They are encouraged to call to make an appointment for follow-up as discussed as well as provided in a written handout of instructions. Patient was educated on signs and symptoms to watch for indicative of reevaluation in the emergency department setting to include any worsening of current symptoms despite the treatment provided. Patient verbalized understanding of instructions and is amenable to this treatment plan. Patient departed in stable condition with no other social determinants of health that would obscure this outpatient management plan.    Plan of Care/Counseling:  I reviewed today's visit with the patient and partner in addition to providing specific details for the plan of care and counseling regarding the diagnosis and prognosis.  Questions are answered at this time and are agreeable with the plan.    Assessment      1. Dental abscess    2. Dental  decay      Plan   Discharge home  Patient condition is stable    New Medications     New Medications Ordered This Visit   Medications    ketorolac (Toradol) injection 30 mg    clindamycin (Cleocin) capsule 450 mg     Order Specific Question:   Suspected Indication (Select all that apply)     Answer:   Sinusitis/Other ENT     Order Specific Question:   Type of Therapy     Answer:   Empiric    oxyCODONE (Roxicodone) immediate release tablet 5 mg     Order Specific Question:   If ordered PRN for pain, nurse is permitted to administer this medication for higher pain scores based on patient preference?     Answer:   Yes    clindamycin (Cleocin) 150 mg capsule     Sig: Take 3 capsules (450 mg) by mouth 3 times a day for 7 days.     Dispense:  63 capsule     Refill:  0    chlorhexidine (Peridex) 0.12 % solution     Sig: Use 15 mL in the mouth or throat 2 times a day for 7 days.     Dispense:  240 mL     Refill:  0    ibuprofen 600 mg tablet     Sig: Take 1 tablet (600 mg) by mouth every 6 hours if needed (PAIN OR FEVER) for up to 5 days. TAKE WITH FOOD     Dispense:  20 tablet     Refill:  0     Electronically signed by GEO Prado   DD: [unfilled]  **This report was transcribed using voice recognition software. Every effort was made to ensure accuracy; however, inadvertent computerized transcription errors may be present.  END OF ED PROVIDER NOTE     GEO Prado  02/24/25 1130

## 2025-03-25 ENCOUNTER — APPOINTMENT (OUTPATIENT)
Dept: CARDIOLOGY | Facility: CLINIC | Age: 45
End: 2025-03-25
Payer: COMMERCIAL

## 2025-05-07 ENCOUNTER — APPOINTMENT (OUTPATIENT)
Dept: CARDIOLOGY | Facility: HOSPITAL | Age: 45
End: 2025-05-07
Payer: COMMERCIAL

## 2025-05-29 PROBLEM — I50.22 CHRONIC HEART FAILURE WITH REDUCED EJECTION FRACTION (HFREF, <= 40%): Status: ACTIVE | Noted: 2025-05-29

## 2025-06-03 ENCOUNTER — APPOINTMENT (OUTPATIENT)
Dept: CARDIOLOGY | Facility: CLINIC | Age: 45
End: 2025-06-03
Payer: COMMERCIAL

## 2025-09-02 ENCOUNTER — APPOINTMENT (OUTPATIENT)
Dept: CARDIOLOGY | Facility: CLINIC | Age: 45
End: 2025-09-02
Payer: COMMERCIAL

## 2025-11-11 ENCOUNTER — APPOINTMENT (OUTPATIENT)
Dept: CARDIOLOGY | Facility: CLINIC | Age: 45
End: 2025-11-11
Payer: COMMERCIAL

## (undated) DEVICE — GUIDEWIRE, UNIVERSAL BALANCE MID WEIGHT

## (undated) DEVICE — GUIDE WIRE, 260CM, HI-TORQUE, VERSACORE, MODIFIED J

## (undated) DEVICE — CATHETER, WEDGE PRESSURE, BALLOON, DOUBLE LUMEN, 5 FR, 110 CM

## (undated) DEVICE — SHEATH, GLIDESHEATH, SLENDER, 5FR 10CM

## (undated) DEVICE — SHEATH, GLIDESHEATH, SLENDER, 6FR 10CM

## (undated) DEVICE — CATHETER, OPTITORQUE, 5FR, TIG, 1H/100CM

## (undated) DEVICE — TR BAND, RADIAL COMPRESSION, STANDARD, 24CM